# Patient Record
Sex: FEMALE | Race: BLACK OR AFRICAN AMERICAN | NOT HISPANIC OR LATINO | Employment: FULL TIME | URBAN - METROPOLITAN AREA
[De-identification: names, ages, dates, MRNs, and addresses within clinical notes are randomized per-mention and may not be internally consistent; named-entity substitution may affect disease eponyms.]

---

## 2023-03-15 DIAGNOSIS — I10 HYPERTENSION, UNSPECIFIED TYPE: Primary | ICD-10-CM

## 2023-03-15 RX ORDER — METOPROLOL TARTRATE 50 MG/1
1 TABLET ORAL
COMMUNITY
Start: 2020-02-22 | End: 2023-03-15 | Stop reason: SDUPTHER

## 2023-03-15 RX ORDER — METOPROLOL TARTRATE 50 MG/1
50 TABLET ORAL 2 TIMES DAILY
Qty: 180 TABLET | Refills: 0 | Status: SHIPPED | OUTPATIENT
Start: 2023-03-15 | End: 2023-07-20 | Stop reason: SDUPTHER

## 2023-05-11 PROBLEM — M51.36 ANNULAR TEAR OF LUMBAR DISC: Status: ACTIVE | Noted: 2023-05-11

## 2023-05-11 PROBLEM — M40.36 FLATBACK SYNDROME, LUMBAR REGION: Status: ACTIVE | Noted: 2023-05-11

## 2023-05-11 PROBLEM — M54.50 LOW BACK PAIN: Status: ACTIVE | Noted: 2023-05-11

## 2023-05-11 PROBLEM — H01.009 BLEPHARITIS: Status: ACTIVE | Noted: 2023-05-11

## 2023-05-11 PROBLEM — M47.819 ARTHRITIS, LOW BACK: Status: ACTIVE | Noted: 2023-05-11

## 2023-05-11 PROBLEM — R60.0 LEG EDEMA, RIGHT: Status: ACTIVE | Noted: 2023-05-11

## 2023-05-11 PROBLEM — I10 ESSENTIAL HYPERTENSION: Status: ACTIVE | Noted: 2023-05-11

## 2023-05-11 PROBLEM — R10.2 VAGINAL PAIN: Status: ACTIVE | Noted: 2023-05-11

## 2023-05-11 PROBLEM — M47.816 LUMBAR SPONDYLOSIS: Status: ACTIVE | Noted: 2023-05-11

## 2023-05-11 PROBLEM — E55.9 VITAMIN D DEFICIENCY: Status: ACTIVE | Noted: 2023-05-11

## 2023-05-11 PROBLEM — K63.5 BENIGN COLONIC POLYP: Status: ACTIVE | Noted: 2023-05-11

## 2023-05-11 PROBLEM — M50.00 CERVICAL DISC DISORDER WITH MYELOPATHY: Status: ACTIVE | Noted: 2023-05-11

## 2023-05-11 PROBLEM — M54.2 NECK PAIN: Status: ACTIVE | Noted: 2023-05-11

## 2023-05-11 PROBLEM — G47.30 SLEEP APNEA: Status: ACTIVE | Noted: 2023-05-11

## 2023-05-11 PROBLEM — E66.3 OVERWEIGHT: Status: ACTIVE | Noted: 2023-05-11

## 2023-05-11 PROBLEM — M79.651 PAIN OF RIGHT THIGH: Status: ACTIVE | Noted: 2023-05-11

## 2023-05-11 PROBLEM — D64.9 ANEMIA: Status: ACTIVE | Noted: 2023-05-11

## 2023-05-11 PROBLEM — K59.09 CHRONIC CONSTIPATION: Status: ACTIVE | Noted: 2023-05-11

## 2023-05-11 PROBLEM — M79.89 SWELLING OF RIGHT LOWER EXTREMITY: Status: ACTIVE | Noted: 2023-05-11

## 2023-05-11 PROBLEM — M25.571 ARTHRALGIA OF RIGHT ANKLE: Status: ACTIVE | Noted: 2023-05-11

## 2023-05-11 PROBLEM — R10.2 FEMALE PELVIC PAIN: Status: ACTIVE | Noted: 2023-05-11

## 2023-05-11 PROBLEM — M51.369 ANNULAR TEAR OF LUMBAR DISC: Status: ACTIVE | Noted: 2023-05-11

## 2023-05-11 PROBLEM — G62.9 NEUROPATHY: Status: ACTIVE | Noted: 2023-05-11

## 2023-05-11 PROBLEM — L65.9 HAIR LOSS: Status: ACTIVE | Noted: 2023-05-11

## 2023-05-11 PROBLEM — M16.11 PRIMARY OSTEOARTHRITIS OF RIGHT HIP: Status: ACTIVE | Noted: 2023-05-11

## 2023-05-11 PROBLEM — D21.9 FIBROIDS: Status: ACTIVE | Noted: 2023-05-11

## 2023-05-11 PROBLEM — I51.7 LEFT VENTRICULAR HYPERTROPHY BY ELECTROCARDIOGRAM: Status: ACTIVE | Noted: 2023-05-11

## 2023-05-11 PROBLEM — M21.40 PES PLANUS: Status: ACTIVE | Noted: 2023-05-11

## 2023-05-11 PROBLEM — E78.5 HYPERLIPIDEMIA: Status: ACTIVE | Noted: 2023-05-11

## 2023-05-11 PROBLEM — M70.51 BURSITIS OF RIGHT KNEE: Status: ACTIVE | Noted: 2023-05-11

## 2023-05-11 PROBLEM — M48.02 CERVICAL STENOSIS OF SPINAL CANAL: Status: ACTIVE | Noted: 2023-05-11

## 2023-05-11 PROBLEM — K62.5 RECTAL BLEEDING: Status: ACTIVE | Noted: 2023-05-11

## 2023-05-11 PROBLEM — R00.2 PALPITATIONS: Status: ACTIVE | Noted: 2023-05-11

## 2023-05-11 PROBLEM — M25.559 ARTHRALGIA OF HIP: Status: ACTIVE | Noted: 2023-05-11

## 2023-05-11 PROBLEM — R19.00 PELVIC MASS IN FEMALE: Status: ACTIVE | Noted: 2023-05-11

## 2023-05-11 PROBLEM — R25.2 SPASTICITY: Status: ACTIVE | Noted: 2023-05-11

## 2023-05-11 PROBLEM — R74.8 ELEVATED SERUM ALKALINE PHOSPHATASE LEVEL: Status: ACTIVE | Noted: 2023-05-11

## 2023-05-11 PROBLEM — M41.9 LUMBAR SCOLIOSIS: Status: ACTIVE | Noted: 2023-05-11

## 2023-05-12 ENCOUNTER — OFFICE VISIT (OUTPATIENT)
Dept: PRIMARY CARE | Facility: CLINIC | Age: 58
End: 2023-05-12
Payer: COMMERCIAL

## 2023-05-12 ENCOUNTER — LAB (OUTPATIENT)
Dept: LAB | Facility: LAB | Age: 58
End: 2023-05-12
Payer: COMMERCIAL

## 2023-05-12 VITALS
BODY MASS INDEX: 28.52 KG/M2 | TEMPERATURE: 95.5 F | WEIGHT: 155 LBS | SYSTOLIC BLOOD PRESSURE: 110 MMHG | DIASTOLIC BLOOD PRESSURE: 60 MMHG | HEIGHT: 62 IN

## 2023-05-12 DIAGNOSIS — Z00.00 HEALTHCARE MAINTENANCE: ICD-10-CM

## 2023-05-12 DIAGNOSIS — I10 ESSENTIAL HYPERTENSION: Primary | ICD-10-CM

## 2023-05-12 DIAGNOSIS — I10 ESSENTIAL HYPERTENSION: ICD-10-CM

## 2023-05-12 LAB
ALANINE AMINOTRANSFERASE (SGPT) (U/L) IN SER/PLAS: 15 U/L (ref 7–45)
ALBUMIN (G/DL) IN SER/PLAS: 4 G/DL (ref 3.4–5)
ALBUMIN (MG/L) IN URINE: <7 MG/L
ALBUMIN/CREATININE (UG/MG) IN URINE: NORMAL UG/MG CRT (ref 0–30)
ALKALINE PHOSPHATASE (U/L) IN SER/PLAS: 86 U/L (ref 33–110)
ANION GAP IN SER/PLAS: 11 MMOL/L (ref 10–20)
ASPARTATE AMINOTRANSFERASE (SGOT) (U/L) IN SER/PLAS: 15 U/L (ref 9–39)
BILIRUBIN TOTAL (MG/DL) IN SER/PLAS: 0.6 MG/DL (ref 0–1.2)
CALCIUM (MG/DL) IN SER/PLAS: 9.6 MG/DL (ref 8.6–10.6)
CARBON DIOXIDE, TOTAL (MMOL/L) IN SER/PLAS: 29 MMOL/L (ref 21–32)
CHLORIDE (MMOL/L) IN SER/PLAS: 104 MMOL/L (ref 98–107)
CHOLESTEROL (MG/DL) IN SER/PLAS: 210 MG/DL (ref 0–199)
CHOLESTEROL IN HDL (MG/DL) IN SER/PLAS: 62.2 MG/DL
CHOLESTEROL/HDL RATIO: 3.4
CREATININE (MG/DL) IN SER/PLAS: 0.98 MG/DL (ref 0.5–1.05)
CREATININE (MG/DL) IN URINE: 116 MG/DL (ref 20–320)
GFR FEMALE: 66 ML/MIN/1.73M2
GLUCOSE (MG/DL) IN SER/PLAS: 74 MG/DL (ref 74–99)
LDL: 133 MG/DL (ref 0–99)
POTASSIUM (MMOL/L) IN SER/PLAS: 4.2 MMOL/L (ref 3.5–5.3)
PROTEIN TOTAL: 7.1 G/DL (ref 6.4–8.2)
SODIUM (MMOL/L) IN SER/PLAS: 140 MMOL/L (ref 136–145)
TRIGLYCERIDE (MG/DL) IN SER/PLAS: 74 MG/DL (ref 0–149)
UREA NITROGEN (MG/DL) IN SER/PLAS: 16 MG/DL (ref 6–23)
VLDL: 15 MG/DL (ref 0–40)

## 2023-05-12 PROCEDURE — 3074F SYST BP LT 130 MM HG: CPT | Performed by: INTERNAL MEDICINE

## 2023-05-12 PROCEDURE — 80053 COMPREHEN METABOLIC PANEL: CPT

## 2023-05-12 PROCEDURE — 82043 UR ALBUMIN QUANTITATIVE: CPT

## 2023-05-12 PROCEDURE — 99214 OFFICE O/P EST MOD 30 MIN: CPT | Performed by: INTERNAL MEDICINE

## 2023-05-12 PROCEDURE — 80061 LIPID PANEL: CPT

## 2023-05-12 PROCEDURE — 36415 COLL VENOUS BLD VENIPUNCTURE: CPT

## 2023-05-12 PROCEDURE — 82570 ASSAY OF URINE CREATININE: CPT

## 2023-05-12 PROCEDURE — 3078F DIAST BP <80 MM HG: CPT | Performed by: INTERNAL MEDICINE

## 2023-05-12 RX ORDER — ATORVASTATIN CALCIUM 10 MG/1
1 TABLET, FILM COATED ORAL DAILY
COMMUNITY
Start: 2022-04-18 | End: 2023-08-16 | Stop reason: SDUPTHER

## 2023-05-12 RX ORDER — ASPIRIN 81 MG/1
1 TABLET ORAL DAILY
COMMUNITY
Start: 2021-09-08

## 2023-05-12 RX ORDER — TRIAMTERENE/HYDROCHLOROTHIAZID 37.5-25 MG
1 TABLET ORAL DAILY
COMMUNITY
End: 2023-06-08 | Stop reason: SDUPTHER

## 2023-05-12 RX ORDER — CHOLECALCIFEROL (VITAMIN D3) 25 MCG
1 TABLET ORAL DAILY
COMMUNITY
Start: 2017-03-04

## 2023-05-12 ASSESSMENT — PATIENT HEALTH QUESTIONNAIRE - PHQ9
2. FEELING DOWN, DEPRESSED OR HOPELESS: NOT AT ALL
1. LITTLE INTEREST OR PLEASURE IN DOING THINGS: NOT AT ALL
2. FEELING DOWN, DEPRESSED OR HOPELESS: NOT AT ALL
SUM OF ALL RESPONSES TO PHQ9 QUESTIONS 1 AND 2: 0
1. LITTLE INTEREST OR PLEASURE IN DOING THINGS: NOT AT ALL
SUM OF ALL RESPONSES TO PHQ9 QUESTIONS 1 & 2: 0

## 2023-05-12 ASSESSMENT — LIFESTYLE VARIABLES
HOW OFTEN DO YOU HAVE SIX OR MORE DRINKS ON ONE OCCASION: NEVER
HOW MANY STANDARD DRINKS CONTAINING ALCOHOL DO YOU HAVE ON A TYPICAL DAY: PATIENT DOES NOT DRINK
HOW OFTEN DO YOU HAVE A DRINK CONTAINING ALCOHOL: NEVER
AUDIT-C TOTAL SCORE: 0
SKIP TO QUESTIONS 9-10: 1

## 2023-05-12 NOTE — PROGRESS NOTES
Subjective   Patient ID: Ryann Alcaraz is a 57 y.o. female who presents for Follow-up.  HPI  57-year-old female with past medical history of hypertension and vitamin D deficiency is here for 3-month follow-up.  She denied any complaints today like chest pain, shortness of breath, cough, numbness and tingling.  The patient has been compliant with all her medications and blood pressure  in the office is 110/70.  Last lab work done in October 2022 and due for blood work on next visit.  Review of Systems  10 point ROS negative  Objective   Physical Exam  CVS S1 and S2 normal  Respiratory system CTAB  Extremities within normal limits  Assessment/Plan   There are no diagnoses linked to this encounter.    Hypertension  Continue metoprolol and Maxide  No change in medications  Will urine albumin     Hyperlipidemia  Atorvastatin  Check lipid panel     Will check CMP, lipid panel, urine albumin and mammogram.

## 2023-05-12 NOTE — PROGRESS NOTES
I reviewed with the resident the medical history and the resident’s findings on physical examination.  I discussed with the resident the patient’s diagnosis and concur with the treatment plan as documented in the resident note.     I saw and evaluated the patient. I personally obtained the key and critical portions of the history and physical exam or was physically present for key and critical portions performed by the trainee. I reviewed the trainee's documentation and discussed the patient with the trainee. I agree with the trainee's medical decision making, as documented on the trainee's note.     Rachel Cerrato MD

## 2023-06-08 DIAGNOSIS — I10 ESSENTIAL HYPERTENSION: Primary | ICD-10-CM

## 2023-06-08 RX ORDER — TRIAMTERENE/HYDROCHLOROTHIAZID 37.5-25 MG
1 TABLET ORAL DAILY
Qty: 90 TABLET | Refills: 0 | Status: SHIPPED | OUTPATIENT
Start: 2023-06-08 | End: 2023-08-16 | Stop reason: SDUPTHER

## 2023-07-20 DIAGNOSIS — I10 HYPERTENSION, UNSPECIFIED TYPE: ICD-10-CM

## 2023-07-20 RX ORDER — METOPROLOL TARTRATE 50 MG/1
50 TABLET ORAL 2 TIMES DAILY
Qty: 180 TABLET | Refills: 0 | Status: SHIPPED | OUTPATIENT
Start: 2023-07-20 | End: 2023-08-16 | Stop reason: SDUPTHER

## 2023-08-16 ENCOUNTER — TELEMEDICINE (OUTPATIENT)
Dept: PRIMARY CARE | Facility: CLINIC | Age: 58
End: 2023-08-16
Payer: COMMERCIAL

## 2023-08-16 DIAGNOSIS — I10 HYPERTENSION, UNSPECIFIED TYPE: ICD-10-CM

## 2023-08-16 DIAGNOSIS — Z00.00 ANNUAL PHYSICAL EXAM: ICD-10-CM

## 2023-08-16 DIAGNOSIS — E78.5 HYPERLIPIDEMIA, UNSPECIFIED HYPERLIPIDEMIA TYPE: ICD-10-CM

## 2023-08-16 DIAGNOSIS — E55.9 VITAMIN D DEFICIENCY: ICD-10-CM

## 2023-08-16 DIAGNOSIS — I10 ESSENTIAL HYPERTENSION: Primary | ICD-10-CM

## 2023-08-16 PROCEDURE — 99443 PR PHYS/QHP TELEPHONE EVALUATION 21-30 MIN: CPT | Performed by: INTERNAL MEDICINE

## 2023-08-16 RX ORDER — ATORVASTATIN CALCIUM 10 MG/1
10 TABLET, FILM COATED ORAL DAILY
Qty: 90 TABLET | Refills: 0 | Status: SHIPPED | OUTPATIENT
Start: 2023-08-16 | End: 2023-10-09 | Stop reason: SDUPTHER

## 2023-08-16 RX ORDER — METOPROLOL TARTRATE 50 MG/1
50 TABLET ORAL 2 TIMES DAILY
Qty: 180 TABLET | Refills: 0 | Status: SHIPPED | OUTPATIENT
Start: 2023-08-16 | End: 2024-01-10 | Stop reason: SDUPTHER

## 2023-08-16 RX ORDER — TRIAMTERENE/HYDROCHLOROTHIAZID 37.5-25 MG
1 TABLET ORAL DAILY
Qty: 90 TABLET | Refills: 0 | Status: SHIPPED | OUTPATIENT
Start: 2023-08-16 | End: 2023-09-08 | Stop reason: SDUPTHER

## 2023-08-16 ASSESSMENT — ENCOUNTER SYMPTOMS
CONSTITUTIONAL NEGATIVE: 1
CARDIOVASCULAR NEGATIVE: 1
RESPIRATORY NEGATIVE: 1
GASTROINTESTINAL NEGATIVE: 1

## 2023-08-16 NOTE — PROGRESS NOTES
Subjective   Patient ID: Ryann Alcaraz is a 57 y.o. female who presents for Follow-up.  HPI    Review of Systems   Constitutional: Negative.    Respiratory: Negative.     Cardiovascular: Negative.    Gastrointestinal: Negative.        Objective   Physical Exam  Neurological:      Mental Status: She is alert.   Psychiatric:         Mood and Affect: Mood normal.         Assessment/Plan   Problem List Items Addressed This Visit       Essential hypertension - Primary    Relevant Medications    triamterene-hydrochlorothiazid (Maxzide-25) 37.5-25 mg tablet    Hyperlipidemia    Relevant Medications    atorvastatin (Lipitor) 10 mg tablet    Vitamin D deficiency     Other Visit Diagnoses       Hypertension, unspecified type        Relevant Medications    metoprolol tartrate (Lopressor) 50 mg tablet    Annual physical exam        Relevant Orders    Referral to Obstetrics / Gynecology          The patient is here for a follow up on chronic medical problems.  His medications were reviewed, pharmacy updated, notes reviewed, prior labs reviewed.    An interactive audio and video telecommunication system which permits real time communications between the patient (at the originating site) and provider (at the distant site) was utilized to provide this telehealth service.    Verbal consent was requested and obtained from the patient on the day of encounter.       HTN.  Well controlled.  Continue with current medications.  Check BP at home daily.  Report to us if the numbers are higher than 130/80.        HLD  Stable  Continue with statins  Check lipids at the next visit    Due for a physical    Needs a new CPAP  Should contact the company and see what the requirements are  May need a new sleep study           Rachel Cerrato MD

## 2023-09-08 DIAGNOSIS — I10 ESSENTIAL HYPERTENSION: ICD-10-CM

## 2023-09-08 RX ORDER — TRIAMTERENE/HYDROCHLOROTHIAZID 37.5-25 MG
1 TABLET ORAL DAILY
Qty: 90 TABLET | Refills: 0 | Status: SHIPPED | OUTPATIENT
Start: 2023-09-08 | End: 2024-01-10 | Stop reason: SDUPTHER

## 2023-10-09 DIAGNOSIS — E78.5 HYPERLIPIDEMIA, UNSPECIFIED HYPERLIPIDEMIA TYPE: ICD-10-CM

## 2023-10-09 RX ORDER — ATORVASTATIN CALCIUM 10 MG/1
10 TABLET, FILM COATED ORAL DAILY
Qty: 90 TABLET | Refills: 0 | Status: SHIPPED | OUTPATIENT
Start: 2023-10-09 | End: 2024-01-10 | Stop reason: SDUPTHER

## 2023-10-10 ENCOUNTER — ANCILLARY PROCEDURE (OUTPATIENT)
Dept: RADIOLOGY | Facility: CLINIC | Age: 58
End: 2023-10-10
Payer: COMMERCIAL

## 2023-10-10 DIAGNOSIS — M79.662 PAIN OF LEFT LOWER LEG: ICD-10-CM

## 2023-10-10 PROCEDURE — 73610 X-RAY EXAM OF ANKLE: CPT | Mod: LEFT SIDE | Performed by: RADIOLOGY

## 2023-10-10 PROCEDURE — 73630 X-RAY EXAM OF FOOT: CPT | Mod: LEFT SIDE | Performed by: RADIOLOGY

## 2023-10-10 PROCEDURE — 73630 X-RAY EXAM OF FOOT: CPT | Mod: LT

## 2023-10-10 PROCEDURE — 73610 X-RAY EXAM OF ANKLE: CPT | Mod: LT,FY

## 2023-11-07 ENCOUNTER — ANCILLARY PROCEDURE (OUTPATIENT)
Dept: RADIOLOGY | Facility: CLINIC | Age: 58
End: 2023-11-07
Payer: COMMERCIAL

## 2023-11-07 DIAGNOSIS — Z12.31 ENCOUNTER FOR SCREENING MAMMOGRAM FOR MALIGNANT NEOPLASM OF BREAST: ICD-10-CM

## 2023-11-07 PROCEDURE — 77063 BREAST TOMOSYNTHESIS BI: CPT

## 2023-11-07 PROCEDURE — 77063 BREAST TOMOSYNTHESIS BI: CPT | Performed by: RADIOLOGY

## 2023-11-07 PROCEDURE — 77067 SCR MAMMO BI INCL CAD: CPT | Performed by: RADIOLOGY

## 2023-11-10 ENCOUNTER — LAB (OUTPATIENT)
Dept: LAB | Facility: LAB | Age: 58
End: 2023-11-10
Payer: COMMERCIAL

## 2023-11-10 ENCOUNTER — HOSPITAL ENCOUNTER (OUTPATIENT)
Dept: RADIOLOGY | Facility: HOSPITAL | Age: 58
Discharge: HOME | End: 2023-11-10
Payer: COMMERCIAL

## 2023-11-10 ENCOUNTER — OFFICE VISIT (OUTPATIENT)
Dept: PRIMARY CARE | Facility: CLINIC | Age: 58
End: 2023-11-10
Payer: COMMERCIAL

## 2023-11-10 VITALS
WEIGHT: 159 LBS | SYSTOLIC BLOOD PRESSURE: 142 MMHG | DIASTOLIC BLOOD PRESSURE: 88 MMHG | HEART RATE: 84 BPM | BODY MASS INDEX: 29.08 KG/M2

## 2023-11-10 DIAGNOSIS — I10 ESSENTIAL HYPERTENSION: ICD-10-CM

## 2023-11-10 DIAGNOSIS — E78.00 PURE HYPERCHOLESTEROLEMIA: ICD-10-CM

## 2023-11-10 DIAGNOSIS — M25.522 LEFT ELBOW PAIN: ICD-10-CM

## 2023-11-10 DIAGNOSIS — Z00.00 ANNUAL PHYSICAL EXAM: ICD-10-CM

## 2023-11-10 DIAGNOSIS — G47.34 IDIOPATHIC SLEEP RELATED NONOBSTRUCTIVE ALVEOLAR HYPOVENTILATION: ICD-10-CM

## 2023-11-10 DIAGNOSIS — M25.522 LEFT ELBOW PAIN: Primary | ICD-10-CM

## 2023-11-10 DIAGNOSIS — E55.9 VITAMIN D DEFICIENCY: ICD-10-CM

## 2023-11-10 DIAGNOSIS — Z12.11 ENCOUNTER FOR SCREENING COLONOSCOPY: ICD-10-CM

## 2023-11-10 LAB
25(OH)D3 SERPL-MCNC: 32 NG/ML (ref 30–100)
ALBUMIN SERPL BCP-MCNC: 4.2 G/DL (ref 3.4–5)
ALP SERPL-CCNC: 108 U/L (ref 33–136)
ALT SERPL W P-5'-P-CCNC: 35 U/L (ref 7–45)
ANION GAP SERPL CALC-SCNC: 11 MMOL/L (ref 10–20)
AST SERPL W P-5'-P-CCNC: 30 U/L (ref 9–39)
BILIRUB SERPL-MCNC: 0.6 MG/DL (ref 0–1.2)
BUN SERPL-MCNC: 18 MG/DL (ref 6–23)
CALCIUM SERPL-MCNC: 10.2 MG/DL (ref 8.6–10.6)
CHLORIDE SERPL-SCNC: 101 MMOL/L (ref 98–107)
CHOLEST SERPL-MCNC: 205 MG/DL (ref 0–199)
CHOLESTEROL/HDL RATIO: 2.9
CO2 SERPL-SCNC: 33 MMOL/L (ref 21–32)
CREAT SERPL-MCNC: 0.93 MG/DL (ref 0.5–1.05)
CREAT UR-MCNC: 104.4 MG/DL (ref 20–320)
ERYTHROCYTE [DISTWIDTH] IN BLOOD BY AUTOMATED COUNT: 12.1 % (ref 11.5–14.5)
EST. AVERAGE GLUCOSE BLD GHB EST-MCNC: 94 MG/DL
GFR SERPL CREATININE-BSD FRML MDRD: 71 ML/MIN/1.73M*2
GLUCOSE SERPL-MCNC: 74 MG/DL (ref 74–99)
HBA1C MFR BLD: 4.9 %
HCT VFR BLD AUTO: 42.4 % (ref 36–46)
HDLC SERPL-MCNC: 71.2 MG/DL
HGB BLD-MCNC: 13.8 G/DL (ref 12–16)
LDLC SERPL CALC-MCNC: 121 MG/DL
MCH RBC QN AUTO: 31.1 PG (ref 26–34)
MCHC RBC AUTO-ENTMCNC: 32.5 G/DL (ref 32–36)
MCV RBC AUTO: 96 FL (ref 80–100)
MICROALBUMIN UR-MCNC: <7 MG/L
MICROALBUMIN/CREAT UR: NORMAL MG/G{CREAT}
NON HDL CHOLESTEROL: 134 MG/DL (ref 0–149)
NRBC BLD-RTO: 0 /100 WBCS (ref 0–0)
PLATELET # BLD AUTO: 300 X10*3/UL (ref 150–450)
POTASSIUM SERPL-SCNC: 4.6 MMOL/L (ref 3.5–5.3)
PROT SERPL-MCNC: 7.8 G/DL (ref 6.4–8.2)
RBC # BLD AUTO: 4.44 X10*6/UL (ref 4–5.2)
SODIUM SERPL-SCNC: 140 MMOL/L (ref 136–145)
TRIGL SERPL-MCNC: 64 MG/DL (ref 0–149)
TSH SERPL-ACNC: 1.87 MIU/L (ref 0.44–3.98)
VIT B12 SERPL-MCNC: 480 PG/ML (ref 211–911)
VLDL: 13 MG/DL (ref 0–40)
WBC # BLD AUTO: 7.5 X10*3/UL (ref 4.4–11.3)

## 2023-11-10 PROCEDURE — 3077F SYST BP >= 140 MM HG: CPT | Performed by: INTERNAL MEDICINE

## 2023-11-10 PROCEDURE — 1036F TOBACCO NON-USER: CPT | Performed by: INTERNAL MEDICINE

## 2023-11-10 PROCEDURE — 73080 X-RAY EXAM OF ELBOW: CPT | Mod: LEFT SIDE | Performed by: RADIOLOGY

## 2023-11-10 PROCEDURE — 73080 X-RAY EXAM OF ELBOW: CPT | Mod: LT

## 2023-11-10 PROCEDURE — 82306 VITAMIN D 25 HYDROXY: CPT

## 2023-11-10 PROCEDURE — 80061 LIPID PANEL: CPT

## 2023-11-10 PROCEDURE — 3079F DIAST BP 80-89 MM HG: CPT | Performed by: INTERNAL MEDICINE

## 2023-11-10 PROCEDURE — 82607 VITAMIN B-12: CPT

## 2023-11-10 PROCEDURE — 82043 UR ALBUMIN QUANTITATIVE: CPT

## 2023-11-10 PROCEDURE — 36415 COLL VENOUS BLD VENIPUNCTURE: CPT

## 2023-11-10 PROCEDURE — 83036 HEMOGLOBIN GLYCOSYLATED A1C: CPT

## 2023-11-10 PROCEDURE — 80053 COMPREHEN METABOLIC PANEL: CPT

## 2023-11-10 PROCEDURE — 84443 ASSAY THYROID STIM HORMONE: CPT

## 2023-11-10 PROCEDURE — 82570 ASSAY OF URINE CREATININE: CPT

## 2023-11-10 PROCEDURE — 99396 PREV VISIT EST AGE 40-64: CPT | Performed by: INTERNAL MEDICINE

## 2023-11-10 PROCEDURE — 85027 COMPLETE CBC AUTOMATED: CPT

## 2023-11-10 ASSESSMENT — ENCOUNTER SYMPTOMS
ARTHRALGIAS: 1
JOINT SWELLING: 1

## 2023-11-10 ASSESSMENT — PATIENT HEALTH QUESTIONNAIRE - PHQ9
2. FEELING DOWN, DEPRESSED OR HOPELESS: NOT AT ALL
1. LITTLE INTEREST OR PLEASURE IN DOING THINGS: NOT AT ALL
SUM OF ALL RESPONSES TO PHQ9 QUESTIONS 1 AND 2: 0

## 2023-11-10 NOTE — ASSESSMENT & PLAN NOTE
-BP is elevated in 142/88 but her graph is usually normal.  -repeat BP in office is 132/85  -we will c/w with the same anti-HTN medications-maxzide 25 daily and metoprolol 50 BID

## 2023-11-10 NOTE — ASSESSMENT & PLAN NOTE
-Ace wrap and icing of the L ankle. Xray 10/23 reviewed ; no acute pathology.  -Flu vaccine taken  -Mammogram due 11/24  -Pap smear due 2023, pt will make an appointment with her OB/GYN.  -Colonoscopy due 2024 [last done 2019-tubular adenoma polyp resected]

## 2023-11-10 NOTE — PROGRESS NOTES
Subjective   Patient ID: Ryann Alcaraz is a 58 y.o. female who presents for Annual Exam.  HPI  PMHX of vit D deficiency, HTN, sleep apnea [on CPAP], HLD, arthritis.  Pt is in good spirits. She does not require any refills. She recently had L ankle which prompted urgent care visit, but the x-ray did not show nay acute pathology just an old osseous deformity and effusion.   She has a similar L elbow pain x 1 year, aching, 3/10, exacerbated with certain movements and tenderness on pressing on the joint. But it does not restrict her movements or interfere with her routine activities.     She denies any falls, F/C, N/V/D, SOB, chest pain, palpitations.     Review of Systems   Musculoskeletal:  Positive for arthralgias (L elbow pain) and joint swelling (L ankle swelling.).   All other systems reviewed and are negative.      Objective   Physical Exam  Vitals reviewed.   Constitutional:       Appearance: Normal appearance.   HENT:      Head: Normocephalic and atraumatic.      Mouth/Throat:      Mouth: Mucous membranes are moist.   Eyes:      Pupils: Pupils are equal, round, and reactive to light.   Cardiovascular:      Rate and Rhythm: Normal rate and regular rhythm.      Pulses: Normal pulses.      Heart sounds: Normal heart sounds.   Pulmonary:      Effort: Pulmonary effort is normal. No respiratory distress.      Breath sounds: Normal breath sounds.   Abdominal:      General: Abdomen is flat. Bowel sounds are normal.      Palpations: Abdomen is soft.   Musculoskeletal:         General: Swelling (L ankle swelling at the lasteral side) present. No tenderness. Normal range of motion.      Cervical back: Neck supple.   Skin:     General: Skin is warm and dry.   Neurological:      General: No focal deficit present.      Mental Status: She is alert and oriented to person, place, and time.   Psychiatric:         Mood and Affect: Mood normal.       Visit Vitals  /88   Pulse 84        Assessment/Plan   Problem List Items  Addressed This Visit       Essential hypertension     -BP is elevated in 142/88 but her graph is usually normal.  -repeat BP in office is 132/85  -we will c/w with the same anti-HTN medications-maxzide 25 daily and metoprolol 50 BID         Relevant Orders    CBC    Tsh With Reflex To Free T4 If Abnormal    Hyperlipidemia    Relevant Orders    Hemoglobin A1c    Lipid panel    Sleep apnea     -pt uses her CPAP machine regularly but it is very old and she needs a replacement.  -pt will call her insurance company to find out the requirements of replacing her old CPAP machine. We will send a referral to sleep medicine if insurance company demands one.          Vitamin D deficiency    Relevant Orders    Vitamin D 25-Hydroxy,Total (for eval of Vitamin D levels)    Left elbow pain - Primary    Relevant Orders    XR elbow left 3+ views    Annual physical exam     -Ace wrap and icing of the L ankle. Xray 10/23 reviewed ; no acute pathology.  -Flu vaccine taken  -Mammogram due 11/24  -Pap smear due 2023, pt will make an appointment with her OB/GYN.  -Colonoscopy due 2024 [last done 2019-tubular adenoma polyp resected]         Relevant Orders    CBC    Vitamin B12    Tsh With Reflex To Free T4 If Abnormal    Colonoscopy Screening; High Risk Patient; previous tubular adenoma polyp in 2019    Comprehensive metabolic panel    Albumin, urine, random    Encounter for screening colonoscopy    Relevant Orders    Colonoscopy Screening; High Risk Patient; previous tubular adenoma polyp in 2019

## 2023-11-10 NOTE — ASSESSMENT & PLAN NOTE
-pt uses her CPAP machine regularly but it is very old and she needs a replacement.  -pt will call her insurance company to find out the requirements of replacing her old CPAP machine. We will send a referral to sleep medicine if insurance company demands one.

## 2023-11-10 NOTE — PROGRESS NOTES
I saw and evaluated the patient. I personally obtained the key and critical portions of the history and physical exam or was physically present for key and critical portions performed by the resident/fellow. I reviewed the resident/fellow's documentation and discussed the patient with the resident/fellow. I agree with the resident/fellow's medical decision making as documented in the note.    Rachel Cerrato MD

## 2023-11-27 DIAGNOSIS — Z12.11 COLON CANCER SCREENING: Primary | ICD-10-CM

## 2023-11-27 RX ORDER — POLYETHYLENE GLYCOL 3350, SODIUM CHLORIDE, SODIUM BICARBONATE AND POTASSIUM CHLORIDE 420; 5.72; 11.2; 1.48 G/4L; G/4L; G/4L; G/4L
4000 POWDER, FOR SOLUTION ORAL ONCE
Qty: 4000 ML | Refills: 0 | Status: SHIPPED | OUTPATIENT
Start: 2023-11-27 | End: 2023-11-27

## 2024-01-10 DIAGNOSIS — E78.5 HYPERLIPIDEMIA, UNSPECIFIED HYPERLIPIDEMIA TYPE: ICD-10-CM

## 2024-01-10 DIAGNOSIS — I10 HYPERTENSION, UNSPECIFIED TYPE: ICD-10-CM

## 2024-01-10 DIAGNOSIS — I10 ESSENTIAL HYPERTENSION: ICD-10-CM

## 2024-01-10 RX ORDER — METOPROLOL TARTRATE 50 MG/1
50 TABLET ORAL 2 TIMES DAILY
Qty: 180 TABLET | Refills: 0 | Status: SHIPPED | OUTPATIENT
Start: 2024-01-10 | End: 2024-04-09

## 2024-01-10 RX ORDER — ATORVASTATIN CALCIUM 10 MG/1
10 TABLET, FILM COATED ORAL DAILY
Qty: 90 TABLET | Refills: 0 | Status: SHIPPED | OUTPATIENT
Start: 2024-01-10

## 2024-01-10 RX ORDER — TRIAMTERENE/HYDROCHLOROTHIAZID 37.5-25 MG
1 TABLET ORAL DAILY
Qty: 90 TABLET | Refills: 0 | Status: SHIPPED | OUTPATIENT
Start: 2024-01-10 | End: 2024-04-10 | Stop reason: SDUPTHER

## 2024-02-09 ENCOUNTER — APPOINTMENT (OUTPATIENT)
Dept: PRIMARY CARE | Facility: CLINIC | Age: 59
End: 2024-02-09
Payer: COMMERCIAL

## 2024-02-15 NOTE — H&P
History Of Present Illness  Ryann Alcaraz is a 58 y.o. female presenting with history of colon adenomas.     Past Medical History  Past Medical History:   Diagnosis Date    Abdominal distension (gaseous) 06/02/2018    Bloating    Abnormal results of liver function studies 06/09/2018    Abnormal liver function    Abnormal weight gain 12/12/2015    Abnormal weight gain    Body mass index (BMI) 29.0-29.9, adult 01/07/2022    Body mass index (BMI) of 29.0 to 29.9 in adult    Flat foot (pes planus) (acquired), unspecified foot 11/26/2018    Pes planus    Hypersomnia, unspecified 03/24/2015    Excessive sleepiness    Localized edema 09/30/2014    Pedal edema    Localized edema 11/26/2018    Leg edema, right    Other conditions influencing health status 12/12/2015    Stiffness of extremity    Other spondylosis with myelopathy, cervical region     Cervical spondylosis with myelopathy    Pain in right foot 12/12/2015    Pain in both feet    Pain in right foot 11/21/2018    Right foot pain    Pain in right knee 11/12/2018    Acute pain of right knee    Plantar fascial fibromatosis 12/12/2015    Plantar fasciitis    Posterior tibial tendinitis, unspecified leg 11/24/2014    Posterior tibial tendonitis    Radiculopathy, lumbar region 08/31/2018    Lumbar radiculopathy    S/P right breast biopsy 01/27/2009    Right benign excisional biopsy 1998, 2004, 2009    Scoliosis, unspecified     Scoliosis    Unspecified injury of right foot, initial encounter 11/16/2018    Right foot injury, initial encounter    Vitamin D deficiency, unspecified 06/20/2013    Vitamin D deficiency     Surgical History  Past Surgical History:   Procedure Laterality Date    BACK SURGERY  11/20/2016    Back Surgery    BREAST BIOPSY  06/19/2013    Biopsy Breast Percutaneous Needle Core    CERVICAL DISCECTOMY  06/19/2013    Spinal Diskectomy Cervical    COLONOSCOPY  05/30/2019    Complete Colonoscopy    OTHER SURGICAL HISTORY  02/24/2014    Thyroglossal Duct  Cyst Excision     Social History  She reports that she has never smoked. She has never used smokeless tobacco. She reports that she does not drink alcohol and does not use drugs.    Family History  Family History   Problem Relation Name Age of Onset    Breast cancer Father's Sister  80        Allergies  Allergies   Allergen Reactions    Duloxetine Diarrhea, Nausea Only and Unknown    Tramadol Nausea Only and Unknown     Review of Systems     Physical Exam  Vitals and nursing note reviewed.   Constitutional:       Appearance: Normal appearance.   Cardiovascular:      Rate and Rhythm: Normal rate and regular rhythm.      Pulses: Normal pulses.      Heart sounds: Normal heart sounds.   Pulmonary:      Effort: Pulmonary effort is normal.      Breath sounds: Normal breath sounds.   Abdominal:      General: Abdomen is flat.      Palpations: Abdomen is soft.   Neurological:      Mental Status: She is alert.          Last Recorded Vitals  There were no vitals taken for this visit.    Assessment/Plan   Colon adenomas    Proceed with colonoscopy     Reyes Wells MD

## 2024-02-16 ENCOUNTER — HOSPITAL ENCOUNTER (OUTPATIENT)
Dept: GASTROENTEROLOGY | Facility: HOSPITAL | Age: 59
Setting detail: OUTPATIENT SURGERY
Discharge: HOME | End: 2024-02-16
Payer: COMMERCIAL

## 2024-02-16 ENCOUNTER — ANESTHESIA (OUTPATIENT)
Dept: GASTROENTEROLOGY | Facility: HOSPITAL | Age: 59
End: 2024-02-16
Payer: COMMERCIAL

## 2024-02-16 ENCOUNTER — ANESTHESIA EVENT (OUTPATIENT)
Dept: GASTROENTEROLOGY | Facility: HOSPITAL | Age: 59
End: 2024-02-16
Payer: COMMERCIAL

## 2024-02-16 VITALS
TEMPERATURE: 97.2 F | WEIGHT: 160 LBS | OXYGEN SATURATION: 97 % | HEART RATE: 85 BPM | BODY MASS INDEX: 29.44 KG/M2 | SYSTOLIC BLOOD PRESSURE: 122 MMHG | RESPIRATION RATE: 17 BRPM | DIASTOLIC BLOOD PRESSURE: 68 MMHG | HEIGHT: 62 IN

## 2024-02-16 DIAGNOSIS — Z12.11 ENCOUNTER FOR SCREENING COLONOSCOPY: ICD-10-CM

## 2024-02-16 DIAGNOSIS — Z00.00 ANNUAL PHYSICAL EXAM: ICD-10-CM

## 2024-02-16 PROBLEM — G47.33 OSA (OBSTRUCTIVE SLEEP APNEA): Status: ACTIVE | Noted: 2023-05-11

## 2024-02-16 PROBLEM — T88.59XA DELAYED EMERGENCE FROM ANESTHESIA: Status: ACTIVE | Noted: 2024-02-16

## 2024-02-16 PROCEDURE — 2500000004 HC RX 250 GENERAL PHARMACY W/ HCPCS (ALT 636 FOR OP/ED): Performed by: INTERNAL MEDICINE

## 2024-02-16 PROCEDURE — 0753T DGTZ GLS MCRSCP SLD LEVEL IV: CPT | Mod: TC,SUR,AHULAB | Performed by: INTERNAL MEDICINE

## 2024-02-16 PROCEDURE — 45385 COLONOSCOPY W/LESION REMOVAL: CPT | Performed by: INTERNAL MEDICINE

## 2024-02-16 PROCEDURE — 7100000010 HC PHASE TWO TIME - EACH INCREMENTAL 1 MINUTE

## 2024-02-16 PROCEDURE — A45385 PR COLONOSCOPY,REMV LESN,SNARE: Performed by: NURSE ANESTHETIST, CERTIFIED REGISTERED

## 2024-02-16 PROCEDURE — 2500000004 HC RX 250 GENERAL PHARMACY W/ HCPCS (ALT 636 FOR OP/ED): Performed by: NURSE ANESTHETIST, CERTIFIED REGISTERED

## 2024-02-16 PROCEDURE — 3700000001 HC GENERAL ANESTHESIA TIME - INITIAL BASE CHARGE

## 2024-02-16 PROCEDURE — 88305 TISSUE EXAM BY PATHOLOGIST: CPT | Performed by: PATHOLOGY

## 2024-02-16 PROCEDURE — A45385 PR COLONOSCOPY,REMV LESN,SNARE: Performed by: ANESTHESIOLOGY

## 2024-02-16 PROCEDURE — 3700000002 HC GENERAL ANESTHESIA TIME - EACH INCREMENTAL 1 MINUTE

## 2024-02-16 PROCEDURE — 7100000009 HC PHASE TWO TIME - INITIAL BASE CHARGE

## 2024-02-16 RX ORDER — PROPOFOL 10 MG/ML
INJECTION, EMULSION INTRAVENOUS CONTINUOUS PRN
Status: DISCONTINUED | OUTPATIENT
Start: 2024-02-16 | End: 2024-02-16

## 2024-02-16 RX ORDER — MIDAZOLAM HYDROCHLORIDE 1 MG/ML
INJECTION, SOLUTION INTRAMUSCULAR; INTRAVENOUS AS NEEDED
Status: DISCONTINUED | OUTPATIENT
Start: 2024-02-16 | End: 2024-02-16

## 2024-02-16 RX ORDER — PROPOFOL 10 MG/ML
INJECTION, EMULSION INTRAVENOUS AS NEEDED
Status: DISCONTINUED | OUTPATIENT
Start: 2024-02-16 | End: 2024-02-16

## 2024-02-16 RX ORDER — MIDAZOLAM HYDROCHLORIDE 1 MG/ML
INJECTION INTRAMUSCULAR; INTRAVENOUS AS NEEDED
Status: DISCONTINUED | OUTPATIENT
Start: 2024-02-16 | End: 2024-02-16

## 2024-02-16 RX ORDER — SODIUM CHLORIDE, SODIUM LACTATE, POTASSIUM CHLORIDE, CALCIUM CHLORIDE 600; 310; 30; 20 MG/100ML; MG/100ML; MG/100ML; MG/100ML
20 INJECTION, SOLUTION INTRAVENOUS CONTINUOUS
Status: DISCONTINUED | OUTPATIENT
Start: 2024-02-16 | End: 2024-02-17 | Stop reason: HOSPADM

## 2024-02-16 RX ADMIN — PROPOFOL 50 MG: 10 INJECTION, EMULSION INTRAVENOUS at 10:15

## 2024-02-16 RX ADMIN — PROPOFOL 25 MCG/KG/MIN: 10 INJECTION, EMULSION INTRAVENOUS at 10:05

## 2024-02-16 RX ADMIN — PROPOFOL 50 MG: 10 INJECTION, EMULSION INTRAVENOUS at 10:13

## 2024-02-16 RX ADMIN — SODIUM CHLORIDE, POTASSIUM CHLORIDE, SODIUM LACTATE AND CALCIUM CHLORIDE 20 ML/HR: 600; 310; 30; 20 INJECTION, SOLUTION INTRAVENOUS at 09:19

## 2024-02-16 RX ADMIN — MIDAZOLAM HYDROCHLORIDE 2 MG: 1 INJECTION INTRAMUSCULAR; INTRAVENOUS at 10:05

## 2024-02-16 RX ADMIN — PROPOFOL 100 MG: 10 INJECTION, EMULSION INTRAVENOUS at 10:10

## 2024-02-16 ASSESSMENT — PAIN - FUNCTIONAL ASSESSMENT
PAIN_FUNCTIONAL_ASSESSMENT: 0-10

## 2024-02-16 ASSESSMENT — PAIN SCALES - GENERAL
PAINLEVEL_OUTOF10: 0 - NO PAIN
PAIN_LEVEL: 0

## 2024-02-16 ASSESSMENT — COLUMBIA-SUICIDE SEVERITY RATING SCALE - C-SSRS
6. HAVE YOU EVER DONE ANYTHING, STARTED TO DO ANYTHING, OR PREPARED TO DO ANYTHING TO END YOUR LIFE?: NO
2. HAVE YOU ACTUALLY HAD ANY THOUGHTS OF KILLING YOURSELF?: NO
1. IN THE PAST MONTH, HAVE YOU WISHED YOU WERE DEAD OR WISHED YOU COULD GO TO SLEEP AND NOT WAKE UP?: NO

## 2024-02-16 NOTE — ANESTHESIA PREPROCEDURE EVALUATION
Patient: Ryann Alcaraz    Procedure Information       Date/Time: 02/16/24 0950    Scheduled providers: Reyes Wells MD; Jayla Hess MD; [place] Conway; Ruth Bailon RN    Procedure: COLONOSCOPY    Location: [facility]                                                                                               Pre-Anesthesia Evaluation      Ryann Alcaraz is a 58 y.o. female who presents for procedure stated above.       Past Medical History:   Diagnosis Date    Abdominal distension (gaseous) 06/02/2018    Bloating    Abnormal results of liver function studies 06/09/2018    Abnormal liver function    Abnormal weight gain 12/12/2015    Abnormal weight gain    Body mass index (BMI) 29.0-29.9, adult 01/07/2022    Body mass index (BMI) of 29.0 to 29.9 in adult    Flat foot (pes planus) (acquired), unspecified foot 11/26/2018    Pes planus    Hypersomnia, unspecified 03/24/2015    Excessive sleepiness    Localized edema 09/30/2014    Pedal edema    Localized edema 11/26/2018    Leg edema, right    Other conditions influencing health status 12/12/2015    Stiffness of extremity    Other spondylosis with myelopathy, cervical region     Cervical spondylosis with myelopathy    Pain in right foot 12/12/2015    Pain in both feet    Pain in right foot 11/21/2018    Right foot pain    Pain in right knee 11/12/2018    Acute pain of right knee    Plantar fascial fibromatosis 12/12/2015    Plantar fasciitis    Posterior tibial tendinitis, unspecified leg 11/24/2014    Posterior tibial tendonitis    Radiculopathy, lumbar region 08/31/2018    Lumbar radiculopathy    S/P right breast biopsy 01/27/2009    Right benign excisional biopsy 1998, 2004, 2009    Scoliosis, unspecified     Scoliosis    Unspecified injury of right foot, initial encounter 11/16/2018    Right foot injury, initial encounter    Vitamin D deficiency, unspecified 06/20/2013    Vitamin D deficiency     Past Surgical History:  "  Procedure Laterality Date    BACK SURGERY  11/20/2016    Back Surgery    BREAST BIOPSY  06/19/2013    Biopsy Breast Percutaneous Needle Core    CERVICAL DISCECTOMY  06/19/2013    Spinal Diskectomy Cervical    COLONOSCOPY  05/30/2019    Complete Colonoscopy    OTHER SURGICAL HISTORY  02/24/2014    Thyroglossal Duct Cyst Excision     Social History     Tobacco Use    Smoking status: Never    Smokeless tobacco: Never   Vaping Use    Vaping Use: Never used   Substance Use Topics    Alcohol use: Never    Drug use: Never      Allergies   Allergen Reactions    Duloxetine Diarrhea, Nausea Only and Unknown    Tramadol Nausea Only and Unknown      Current Outpatient Medications   Medication Instructions    aspirin 81 mg EC tablet 1 tablet, oral, Daily    atorvastatin (LIPITOR) 10 mg, oral, Daily    cholecalciferol (Vitamin D-3) 25 MCG (1000 UT) tablet 1 tablet, oral, Daily    metoprolol tartrate (LOPRESSOR) 50 mg, oral, 2 times daily    multivit with minerals/lutein (MULTIVITAMIN 50 PLUS ORAL) 1 tablet, oral, Daily    triamterene-hydrochlorothiazid (Maxzide-25) 37.5-25 mg tablet 1 tablet, oral, Daily           Chemistry    Lab Results   Component Value Date/Time     11/10/2023 0952    K 4.6 11/10/2023 0952     11/10/2023 0952    CO2 33 (H) 11/10/2023 0952    BUN 18 11/10/2023 0952    CREATININE 0.93 11/10/2023 0952    Lab Results   Component Value Date/Time    CALCIUM 10.2 11/10/2023 0952    ALKPHOS 108 11/10/2023 0952    AST 30 11/10/2023 0952    ALT 35 11/10/2023 0952    BILITOT 0.6 11/10/2023 0952          Lab Results   Component Value Date/Time    WBC 7.5 11/10/2023 0952    HGB 13.8 11/10/2023 0952    HCT 42.4 11/10/2023 0952     11/10/2023 0952     No results found for: \"PROTIME\", \"PTT\", \"INR\"     Past Cardiology Tests (Last 3 Years):  EKG:  No results found for this or any previous visit.    Echo: 2018   1. The left ventricular systolic function is normal with a 60% estimated ejection fraction.   2. " "Spectral Doppler shows an impaired relaxation pattern of left ventricular diastolic filling.   3. RVSP within normal limits.   4. Aortic valve stenosis is not present.  No results found for this or any previous visit.    Cath:  No results found for this or any previous visit.    Stress Test:  No results found for this or any previous visit from the past 1000 days.      Visit Vitals  /71   Pulse 87   Temp 36.4 °C (97.5 °F) (Temporal)   Resp 17   Ht 1.575 m (5' 2\")   Wt 72.6 kg (160 lb)   LMP  (LMP Unknown)   SpO2 98%   BMI 29.26 kg/m²   OB Status Postmenopausal   Smoking Status Never   BSA 1.78 m²          Relevant Problems   Anesthesia   (+) Delayed emergence from anesthesia      Cardiovascular   (+) Essential hypertension   (+) Hyperlipidemia   (+) Left ventricular hypertrophy by electrocardiogram      GI   (+) Rectal bleeding      Pulmonary   (+) CLARITZA (obstructive sleep apnea)      Hematology   (+) Anemia      Musculoskeletal   (+) Annular tear of lumbar disc   (+) Cervical stenosis of spinal canal   (+) Lumbar scoliosis   (+) Lumbar spondylosis   (+) Primary osteoarthritis of right hip      Other   (+) Arthritis, low back       Clinical information reviewed:   Tobacco  Allergies  Meds   Med Hx  Surg Hx  OB Status  Fam Hx  Soc   Hx        NPO Detail:  NPO/Void Status  Carbohydrate Drink Given Prior to Surgery? : N  Date of Last Liquid: 02/16/24  Time of Last Liquid: 0500  Date of Last Solid: 02/14/24  Time of Last Solid: 2000  Last Intake Type: Clear fluids  Time of Last Void: 0917         Physical Exam    Airway  Mallampati: III  TM distance: >3 FB  Neck ROM: limited  Comments: Mouth opening adequate  Difficult airway anticipated- yes   Cardiovascular   Rhythm: regular  Rate: normal     Dental - normal exam     Pulmonary   Comments: Non labored respiration   Abdominal            Anesthesia Plan    History of general anesthesia?: yes  History of complications of general anesthesia?: yes    ASA 3 "     MAC     intravenous induction   Anesthetic plan and risks discussed with patient.    Plan discussed with CRNA and CAA.

## 2024-02-16 NOTE — ANESTHESIA POSTPROCEDURE EVALUATION
Patient: Ryann Alcaraz    Procedure Summary       Date: 02/16/24 Room / Location: [facility]    Anesthesia Start: 0958 Anesthesia Stop: 1027    Procedure: COLONOSCOPY Diagnosis:       Annual physical exam      Encounter for screening colonoscopy    Scheduled Providers: Reyes Wells MD; Jayla Hess MD; [place] Conway; Ruth Bailon RN Responsible Provider: DELISA Jade    Anesthesia Type: MAC ASA Status: 3            Anesthesia Type: MAC    Vitals Value Taken Time   /68 02/16/24 1100   Temp 36.2 °C (97.2 °F) 02/16/24 1100   Pulse 84 02/16/24 1102   Resp Normal 02/16/24 1131   SpO2 100 % 02/16/24 1102   Vitals shown include unvalidated device data.    Anesthesia Post Evaluation    Patient location during evaluation: floor  Patient participation: complete - patient participated  Level of consciousness: awake and alert  Pain score: 0  Pain management: adequate  Airway patency: patent  Cardiovascular status: stable  Respiratory status: spontaneous ventilation  Hydration status: acceptable  Postoperative Nausea and Vomiting: none        No notable events documented.

## 2024-02-19 NOTE — ADDENDUM NOTE
Encounter addended by: Gertrude Cohen RN on: 2/19/2024 10:07 AM   Actions taken: Contacts section saved, Flowsheet accepted October 9, 2017      Kathy Hanna, NP  [address] [place] [address]           [facility]  [address] [place] [address]  Phone: [phone]  Fax: [phone]          Patient: Alhaji Mendez Jr.   MR Number: [id number]   YOB: 1939   Date of Visit: 10/9/2017       Dear Kathy Hanna:    Thank you for referring Alhaji Mendez to me for evaluation. Attached you will find relevant portions of my assessment and plan of care.    If you have questions, please do not hesitate to call me. I look forward to following Alhaji Mendez along with you.    Sincerely,    Harper Win MD    Enclosure  CC:  No Recipients    If you would like to receive this communication electronically, please contact [email or web address] or [phone] to request more information on [employer] access.    For providers and/or their staff who would like to refer a patient to [facility], please contact us through our one-stop-shop provider referral line, [facility], at [phone].    If you feel you have received this communication in error or would no longer like to receive these types of communications, please e-mail [email or web address]

## 2024-02-26 LAB
LABORATORY COMMENT REPORT: NORMAL
PATH REPORT.FINAL DX SPEC: NORMAL
PATH REPORT.GROSS SPEC: NORMAL
PATH REPORT.TOTAL CANCER: NORMAL

## 2024-03-05 ENCOUNTER — OFFICE VISIT (OUTPATIENT)
Dept: PRIMARY CARE | Facility: CLINIC | Age: 59
End: 2024-03-05
Payer: COMMERCIAL

## 2024-03-05 VITALS — DIASTOLIC BLOOD PRESSURE: 72 MMHG | SYSTOLIC BLOOD PRESSURE: 120 MMHG | BODY MASS INDEX: 28.53 KG/M2 | WEIGHT: 156 LBS

## 2024-03-05 DIAGNOSIS — H61.21 EXCESSIVE CERUMEN IN EAR CANAL, RIGHT: Primary | ICD-10-CM

## 2024-03-05 DIAGNOSIS — E78.00 PURE HYPERCHOLESTEROLEMIA: ICD-10-CM

## 2024-03-05 DIAGNOSIS — I10 ESSENTIAL HYPERTENSION: ICD-10-CM

## 2024-03-05 PROCEDURE — 3078F DIAST BP <80 MM HG: CPT | Performed by: INTERNAL MEDICINE

## 2024-03-05 PROCEDURE — 1036F TOBACCO NON-USER: CPT | Performed by: INTERNAL MEDICINE

## 2024-03-05 PROCEDURE — 99213 OFFICE O/P EST LOW 20 MIN: CPT | Performed by: INTERNAL MEDICINE

## 2024-03-05 PROCEDURE — 3074F SYST BP LT 130 MM HG: CPT | Performed by: INTERNAL MEDICINE

## 2024-03-05 ASSESSMENT — PATIENT HEALTH QUESTIONNAIRE - PHQ9
1. LITTLE INTEREST OR PLEASURE IN DOING THINGS: NOT AT ALL
SUM OF ALL RESPONSES TO PHQ9 QUESTIONS 1 AND 2: 0
2. FEELING DOWN, DEPRESSED OR HOPELESS: NOT AT ALL

## 2024-03-05 NOTE — PROGRESS NOTES
I reviewed the resident/fellow's documentation and discussed the patient with the resident/fellow. I agree with the resident/fellow's medical decision making as documented in the note.    Rachel Cerrato MD

## 2024-04-10 DIAGNOSIS — I10 HYPERTENSION, UNSPECIFIED TYPE: ICD-10-CM

## 2024-04-10 RX ORDER — TRIAMTERENE/HYDROCHLOROTHIAZID 37.5-25 MG
1 TABLET ORAL DAILY
Qty: 90 TABLET | Refills: 0 | Status: SHIPPED | OUTPATIENT
Start: 2024-04-10

## 2024-06-12 ENCOUNTER — APPOINTMENT (OUTPATIENT)
Dept: PRIMARY CARE | Facility: CLINIC | Age: 59
End: 2024-06-12
Payer: COMMERCIAL

## 2024-06-12 ENCOUNTER — LAB (OUTPATIENT)
Dept: LAB | Facility: LAB | Age: 59
End: 2024-06-12
Payer: COMMERCIAL

## 2024-06-12 VITALS — SYSTOLIC BLOOD PRESSURE: 120 MMHG | DIASTOLIC BLOOD PRESSURE: 80 MMHG | BODY MASS INDEX: 29.08 KG/M2 | WEIGHT: 159 LBS

## 2024-06-12 DIAGNOSIS — I10 ESSENTIAL HYPERTENSION: Primary | ICD-10-CM

## 2024-06-12 DIAGNOSIS — I51.7 LEFT VENTRICULAR HYPERTROPHY BY ELECTROCARDIOGRAM: ICD-10-CM

## 2024-06-12 DIAGNOSIS — I10 ESSENTIAL HYPERTENSION: ICD-10-CM

## 2024-06-12 DIAGNOSIS — E78.00 PURE HYPERCHOLESTEROLEMIA: ICD-10-CM

## 2024-06-12 LAB
ALBUMIN SERPL BCP-MCNC: 4.2 G/DL (ref 3.4–5)
ALP SERPL-CCNC: 96 U/L (ref 33–136)
ALT SERPL W P-5'-P-CCNC: 24 U/L (ref 7–45)
ANION GAP SERPL CALC-SCNC: 13 MMOL/L (ref 10–20)
AST SERPL W P-5'-P-CCNC: 23 U/L (ref 9–39)
BILIRUB SERPL-MCNC: 0.9 MG/DL (ref 0–1.2)
BUN SERPL-MCNC: 14 MG/DL (ref 6–23)
CALCIUM SERPL-MCNC: 10.2 MG/DL (ref 8.6–10.6)
CHLORIDE SERPL-SCNC: 102 MMOL/L (ref 98–107)
CO2 SERPL-SCNC: 29 MMOL/L (ref 21–32)
CREAT SERPL-MCNC: 0.87 MG/DL (ref 0.5–1.05)
CREAT UR-MCNC: 132 MG/DL (ref 20–320)
EGFRCR SERPLBLD CKD-EPI 2021: 76 ML/MIN/1.73M*2
GLUCOSE SERPL-MCNC: 89 MG/DL (ref 74–99)
MICROALBUMIN UR-MCNC: 8.6 MG/L
MICROALBUMIN/CREAT UR: 6.5 UG/MG CREAT
POTASSIUM SERPL-SCNC: 4.2 MMOL/L (ref 3.5–5.3)
PROT SERPL-MCNC: 7.7 G/DL (ref 6.4–8.2)
SODIUM SERPL-SCNC: 140 MMOL/L (ref 136–145)

## 2024-06-12 PROCEDURE — 3079F DIAST BP 80-89 MM HG: CPT | Performed by: INTERNAL MEDICINE

## 2024-06-12 PROCEDURE — 3074F SYST BP LT 130 MM HG: CPT | Performed by: INTERNAL MEDICINE

## 2024-06-12 PROCEDURE — 82043 UR ALBUMIN QUANTITATIVE: CPT

## 2024-06-12 PROCEDURE — 80053 COMPREHEN METABOLIC PANEL: CPT

## 2024-06-12 PROCEDURE — 36415 COLL VENOUS BLD VENIPUNCTURE: CPT

## 2024-06-12 PROCEDURE — 82570 ASSAY OF URINE CREATININE: CPT

## 2024-06-12 PROCEDURE — 99214 OFFICE O/P EST MOD 30 MIN: CPT | Performed by: INTERNAL MEDICINE

## 2024-06-12 NOTE — PROGRESS NOTES
I reviewed the resident/fellow's documentation and discussed the patient with the resident/fellow. I agree with the resident/fellow's medical decision making as documented in the note.  As the attending physician, I certify that I personally reviewed the patient's history and personally examined the patient to confirm the physical findings described above, and that I reviewed the relevant imaging studies and available reports. I also discussed the differential diagnosis and all of the proposed management plans with the patient and individuals accompanying the patient to this visit. They had the opportunity to ask questions about the proposed management plans and to have those questions answered.     Rachel Cerrato MD

## 2024-06-12 NOTE — PROGRESS NOTES
Chief Complaint:   Chief Complaint   Patient presents with    Follow-up      Subjective     HPI    58 y.o. female with a PMH significant for essential hypertension,hyperlipidemia, presents to the clinic for routine follow up and medication refill. During this encounter patient c/o muscle aches which she believes related to statins. She describe the discomfort as soreness and not in any way debilitating. Muscle aches resolve when she would stop taking statins. No new complaints or concerns. ROS otherwise negative.         Objective    Visit Vitals  /80      BMI Readings from Last 15 Encounters:   06/12/24 29.08 kg/m²   03/05/24 28.53 kg/m²   02/16/24 29.26 kg/m²   11/10/23 29.08 kg/m²   05/12/23 28.35 kg/m²   01/16/23 28.90 kg/m²   11/03/22 29.26 kg/m²   10/10/22 29.52 kg/m²   08/29/22 28.53 kg/m²   07/19/22 28.90 kg/m²   04/18/22 30.66 kg/m²   01/07/22 29.69 kg/m²   09/08/21 30.35 kg/m²   06/14/21 31.44 kg/m²   03/02/21 30.27 kg/m²      Lab Results   Component Value Date    HGBA1C 4.9 11/10/2023      Lab Results   Component Value Date    HGBA1C 4.9 11/10/2023    HGBA1C 4.9 10/10/2022    HGBA1C 5.6 01/07/2022     Lab Results   Component Value Date    LDLCALC 121 (H) 11/10/2023    CREATININE 0.93 11/10/2023      Lab Results   Component Value Date    WBC 7.5 11/10/2023    HGB 13.8 11/10/2023    HCT 42.4 11/10/2023    MCV 96 11/10/2023     11/10/2023        Chemistry    Lab Results   Component Value Date/Time     11/10/2023 0952    K 4.6 11/10/2023 0952     11/10/2023 0952    CO2 33 (H) 11/10/2023 0952    BUN 18 11/10/2023 0952    CREATININE 0.93 11/10/2023 0952    Lab Results   Component Value Date/Time    CALCIUM 10.2 11/10/2023 0952    ALKPHOS 108 11/10/2023 0952    AST 30 11/10/2023 0952    ALT 35 11/10/2023 0952    BILITOT 0.6 11/10/2023 0952           No images are attached to the encounter.   [unfilled]       Physical Exam   Gen: well appearing, in NAD  HEENT: AT/NC, no conjunctival  pallor, anicteric sclera, EOMI   Neck: supple, no LAD/JVD  Chest: Good air entry b/l, no adventitious sounds heard  CVS: s1 & S2 only, no MGR  Abd: soft, flat, NT/ND, BS+  Ext: no cyanosis/clubbing or pedal edema  Neuro: Aox3, cn 2-12 intact, motor 5/5 globally, sensation intact    The 10-year ASCVD risk score (Rosangela ZAVALETA, et al., 2019) is: 5%    Values used to calculate the score:      Age: 58 years      Sex: Female      Is Non- : Yes      Diabetic: No      Tobacco smoker: No      Systolic Blood Pressure: 120 mmHg      Is BP treated: Yes      HDL Cholesterol: 71.2 mg/dL      Total Cholesterol: 205 mg/dL     For more information, please refer to:  http://static.heart.org/riskcalc/meghan/index.html  http://tools.acc.org/hdzno-uqqg-nobqnarbs-plus/    * Atherosclerotic cardiovascular disease (ASCVD), defined as coronary death or nonfatal myocardial infarction, or fatal or nonfatal stroke, based on the Pooled Cohort Equations and the work of karla Martin al., respectively.    ** The information required to estimate ASCVD risk includes age, sex, race, total cholesterol, HDL cholesterol, systolic blood pressure, blood pressure lowering medication use, diabetes status, and smoking status.     Assessment/Plan     The following medical issues were discussed during this encounter  :   # Hypertension   -Stable and well controlled. 120/72   -Continue Lopressor 50 mg BID   -Continue Maxzide-25    #Statin related myalgia   -Discontinue statins   -The 10-year ASCVD risk score (Rosangela ZAVALETA, et al., 2019) is: 5%  -Will monitor Lipid profile at next appointment      #Constipation   -Increase fiber content   -Colonoscopy in 2024 unremarkable   -Will revisit at next appointment     -Mammogram due 11/24  -Pap smear due 2023, pt will make an appointment with her OB/GYN.  -Colonoscopy done 2024; next due in 5 years due to hx of tubular adenoma 2019      Please return in 3 months or if symptoms worsen     Thad  MD Pilar

## 2024-06-26 ENCOUNTER — APPOINTMENT (OUTPATIENT)
Dept: PRIMARY CARE | Facility: CLINIC | Age: 59
End: 2024-06-26
Payer: COMMERCIAL

## 2024-09-30 DIAGNOSIS — I10 HYPERTENSION, UNSPECIFIED TYPE: ICD-10-CM

## 2024-09-30 RX ORDER — TRIAMTERENE/HYDROCHLOROTHIAZID 37.5-25 MG
1 TABLET ORAL DAILY
Qty: 90 TABLET | Refills: 0 | Status: SHIPPED | OUTPATIENT
Start: 2024-09-30

## 2024-10-08 ENCOUNTER — APPOINTMENT (OUTPATIENT)
Dept: PRIMARY CARE | Facility: CLINIC | Age: 59
End: 2024-10-08
Payer: COMMERCIAL

## 2024-10-08 ENCOUNTER — LAB (OUTPATIENT)
Dept: LAB | Facility: LAB | Age: 59
End: 2024-10-08
Payer: COMMERCIAL

## 2024-10-08 VITALS — WEIGHT: 160 LBS | SYSTOLIC BLOOD PRESSURE: 115 MMHG | BODY MASS INDEX: 29.26 KG/M2 | DIASTOLIC BLOOD PRESSURE: 75 MMHG

## 2024-10-08 DIAGNOSIS — Z23 FLU VACCINE NEED: ICD-10-CM

## 2024-10-08 DIAGNOSIS — I10 ESSENTIAL HYPERTENSION: Primary | ICD-10-CM

## 2024-10-08 DIAGNOSIS — M54.2 NECK PAIN: ICD-10-CM

## 2024-10-08 DIAGNOSIS — Z23 NEED FOR TDAP VACCINATION: ICD-10-CM

## 2024-10-08 DIAGNOSIS — E55.9 VITAMIN D DEFICIENCY: ICD-10-CM

## 2024-10-08 DIAGNOSIS — I10 HYPERTENSION, UNSPECIFIED TYPE: ICD-10-CM

## 2024-10-08 DIAGNOSIS — M47.816 LUMBAR SPONDYLOSIS: ICD-10-CM

## 2024-10-08 DIAGNOSIS — G47.33 OSA (OBSTRUCTIVE SLEEP APNEA): ICD-10-CM

## 2024-10-08 DIAGNOSIS — E78.00 PURE HYPERCHOLESTEROLEMIA: ICD-10-CM

## 2024-10-08 DIAGNOSIS — I51.7 LEFT VENTRICULAR HYPERTROPHY BY ELECTROCARDIOGRAM: ICD-10-CM

## 2024-10-08 DIAGNOSIS — Z00.00 ANNUAL PHYSICAL EXAM: ICD-10-CM

## 2024-10-08 DIAGNOSIS — I10 ESSENTIAL HYPERTENSION: ICD-10-CM

## 2024-10-08 LAB
25(OH)D3 SERPL-MCNC: 29 NG/ML (ref 30–100)
ALBUMIN SERPL BCP-MCNC: 4.1 G/DL (ref 3.4–5)
ALP SERPL-CCNC: 71 U/L (ref 33–136)
ALT SERPL W P-5'-P-CCNC: 17 U/L (ref 7–45)
ANION GAP SERPL CALC-SCNC: 15 MMOL/L (ref 10–20)
AST SERPL W P-5'-P-CCNC: 18 U/L (ref 9–39)
BILIRUB SERPL-MCNC: 0.7 MG/DL (ref 0–1.2)
BUN SERPL-MCNC: 20 MG/DL (ref 6–23)
CALCIUM SERPL-MCNC: 10 MG/DL (ref 8.6–10.6)
CHLORIDE SERPL-SCNC: 101 MMOL/L (ref 98–107)
CHOLEST SERPL-MCNC: 241 MG/DL (ref 0–199)
CHOLESTEROL/HDL RATIO: 3.4
CO2 SERPL-SCNC: 29 MMOL/L (ref 21–32)
CREAT SERPL-MCNC: 0.98 MG/DL (ref 0.5–1.05)
CREAT UR-MCNC: 100.3 MG/DL (ref 20–320)
EGFRCR SERPLBLD CKD-EPI 2021: 66 ML/MIN/1.73M*2
GLUCOSE SERPL-MCNC: 80 MG/DL (ref 74–99)
HDLC SERPL-MCNC: 71.5 MG/DL
LDLC SERPL CALC-MCNC: 157 MG/DL
MICROALBUMIN UR-MCNC: 8 MG/L
MICROALBUMIN/CREAT UR: 8 UG/MG CREAT
NON HDL CHOLESTEROL: 170 MG/DL (ref 0–149)
POTASSIUM SERPL-SCNC: 3.9 MMOL/L (ref 3.5–5.3)
PROT SERPL-MCNC: 7.4 G/DL (ref 6.4–8.2)
SODIUM SERPL-SCNC: 141 MMOL/L (ref 136–145)
TRIGL SERPL-MCNC: 63 MG/DL (ref 0–149)
TSH SERPL-ACNC: 1.58 MIU/L (ref 0.44–3.98)
VLDL: 13 MG/DL (ref 0–40)

## 2024-10-08 PROCEDURE — 80061 LIPID PANEL: CPT

## 2024-10-08 PROCEDURE — 90656 IIV3 VACC NO PRSV 0.5 ML IM: CPT | Performed by: INTERNAL MEDICINE

## 2024-10-08 PROCEDURE — 3078F DIAST BP <80 MM HG: CPT | Performed by: INTERNAL MEDICINE

## 2024-10-08 PROCEDURE — 84443 ASSAY THYROID STIM HORMONE: CPT

## 2024-10-08 PROCEDURE — 85027 COMPLETE CBC AUTOMATED: CPT

## 2024-10-08 PROCEDURE — 82043 UR ALBUMIN QUANTITATIVE: CPT

## 2024-10-08 PROCEDURE — 90471 IMMUNIZATION ADMIN: CPT | Performed by: INTERNAL MEDICINE

## 2024-10-08 PROCEDURE — 36415 COLL VENOUS BLD VENIPUNCTURE: CPT

## 2024-10-08 PROCEDURE — 82570 ASSAY OF URINE CREATININE: CPT

## 2024-10-08 PROCEDURE — 82306 VITAMIN D 25 HYDROXY: CPT

## 2024-10-08 PROCEDURE — 99214 OFFICE O/P EST MOD 30 MIN: CPT | Performed by: INTERNAL MEDICINE

## 2024-10-08 PROCEDURE — 90715 TDAP VACCINE 7 YRS/> IM: CPT | Performed by: INTERNAL MEDICINE

## 2024-10-08 PROCEDURE — 80053 COMPREHEN METABOLIC PANEL: CPT

## 2024-10-08 PROCEDURE — 90472 IMMUNIZATION ADMIN EACH ADD: CPT | Performed by: INTERNAL MEDICINE

## 2024-10-08 PROCEDURE — 3074F SYST BP LT 130 MM HG: CPT | Performed by: INTERNAL MEDICINE

## 2024-10-08 PROCEDURE — 83036 HEMOGLOBIN GLYCOSYLATED A1C: CPT

## 2024-10-08 RX ORDER — METOPROLOL TARTRATE 50 MG/1
50 TABLET ORAL 2 TIMES DAILY
Qty: 180 TABLET | Refills: 0 | Status: SHIPPED | OUTPATIENT
Start: 2024-10-08 | End: 2025-01-06

## 2024-10-08 RX ORDER — CELECOXIB 100 MG/1
100 CAPSULE ORAL 2 TIMES DAILY
Qty: 60 CAPSULE | Refills: 3 | Status: SHIPPED | OUTPATIENT
Start: 2024-10-08 | End: 2024-10-09 | Stop reason: SDUPTHER

## 2024-10-08 ASSESSMENT — ENCOUNTER SYMPTOMS
CONSTITUTIONAL NEGATIVE: 1
RESPIRATORY NEGATIVE: 1
GASTROINTESTINAL NEGATIVE: 1
CARDIOVASCULAR NEGATIVE: 1

## 2024-10-08 NOTE — PROGRESS NOTES
Chief Complaint:   Chief Complaint   Patient presents with    Follow-up      HPI    Ryann Alcaraz is a 59 y.o. year old female who presents to the clinic forfollow up     Past Medical History   Past Medical History:   Diagnosis Date    Abdominal distension (gaseous) 06/02/2018    Bloating    Abnormal results of liver function studies 06/09/2018    Abnormal liver function    Abnormal weight gain 12/12/2015    Abnormal weight gain    Body mass index (BMI) 29.0-29.9, adult 01/07/2022    Body mass index (BMI) of 29.0 to 29.9 in adult    Flat foot (pes planus) (acquired), unspecified foot 11/26/2018    Pes planus    Hypersomnia, unspecified 03/24/2015    Excessive sleepiness    Localized edema 09/30/2014    Pedal edema    Localized edema 11/26/2018    Leg edema, right    Other conditions influencing health status 12/12/2015    Stiffness of extremity    Other spondylosis with myelopathy, cervical region     Cervical spondylosis with myelopathy    Pain in right foot 12/12/2015    Pain in both feet    Pain in right foot 11/21/2018    Right foot pain    Pain in right knee 11/12/2018    Acute pain of right knee    Plantar fascial fibromatosis 12/12/2015    Plantar fasciitis    Posterior tibial tendinitis, unspecified leg 11/24/2014    Posterior tibial tendonitis    Radiculopathy, lumbar region 08/31/2018    Lumbar radiculopathy    S/P right breast biopsy 01/27/2009    Right benign excisional biopsy 1998, 2004, 2009    Scoliosis, unspecified     Scoliosis    Unspecified injury of right foot, initial encounter 11/16/2018    Right foot injury, initial encounter    Vitamin D deficiency, unspecified 06/20/2013    Vitamin D deficiency      Past Surgical History:   Past Surgical History:   Procedure Laterality Date    BACK SURGERY  11/20/2016    Back Surgery    BREAST BIOPSY  06/19/2013    Biopsy Breast Percutaneous Needle Core    CERVICAL DISCECTOMY  06/19/2013    Spinal Diskectomy Cervical    COLONOSCOPY  05/30/2019    Complete  Colonoscopy    OTHER SURGICAL HISTORY  02/24/2014    Thyroglossal Duct Cyst Excision     Family History:   Family History   Problem Relation Name Age of Onset    Breast cancer Father's Sister  80     Social History:   Tobacco Use: Low Risk  (2/16/2024)    Patient History     Smoking Tobacco Use: Never     Smokeless Tobacco Use: Never     Passive Exposure: Not on file      Social History     Substance and Sexual Activity   Alcohol Use Never        Allergies:   Allergies   Allergen Reactions    Duloxetine Diarrhea, Nausea Only and Unknown    Tramadol Nausea Only and Unknown        ROS   Review of Systems   Constitutional: Negative.    Respiratory: Negative.     Cardiovascular: Negative.    Gastrointestinal: Negative.         Objective   Vitals:    10/08/24 1029   BP: 115/75      BMI Readings from Last 15 Encounters:   10/08/24 29.26 kg/m²   06/12/24 29.08 kg/m²   03/05/24 28.53 kg/m²   02/16/24 29.26 kg/m²   11/10/23 29.08 kg/m²   10/10/23 29.27 kg/m²   05/12/23 28.35 kg/m²   01/16/23 28.90 kg/m²   11/03/22 29.26 kg/m²   10/10/22 29.52 kg/m²   08/29/22 28.53 kg/m²   07/19/22 28.90 kg/m²   04/18/22 30.66 kg/m²   01/07/22 29.69 kg/m²   09/08/21 30.35 kg/m²      72.6 kg (160 lb) (10/8/2024 10:29 AM)      Physical Exam  Physical Exam  Constitutional:       Appearance: She is well-developed.   Cardiovascular:      Rate and Rhythm: Normal rate and regular rhythm.      Heart sounds: Normal heart sounds. No murmur heard.  Pulmonary:      Effort: Pulmonary effort is normal.      Breath sounds: Normal breath sounds.   Abdominal:      General: Bowel sounds are normal.      Palpations: Abdomen is soft.          Labs:  CBC:  Recent Labs     11/10/23  0952 10/10/22  1016 04/18/22  0955   WBC 7.5 8.5 7.8   HGB 13.8 14.2 14.0   HCT 42.4 43.2 44.1    278 314   MCV 96 96 91     CMP:  Recent Labs     06/12/24  1458 11/10/23  0952 05/12/23  1151    140 140   K 4.2 4.6 4.2    101 104   CO2 29 33* 29   ANIONGAP 13 11 11  "  BUN 14 18 16   CREATININE 0.87 0.93 0.98   EGFR 76 71  --    GLUCOSE 89 74 74     Recent Labs     06/12/24  1458 11/10/23  0952 05/12/23  1151   ALBUMIN 4.2 4.2 4.0   ALKPHOS 96 108 86   ALT 24 35 15   AST 23 30 15   BILITOT 0.9 0.6 0.6     Calcium/Phos:   Lab Results   Component Value Date    CALCIUM 10.2 06/12/2024      COAG: No results for input(s): \"INR\", \"DDIMERVTE\" in the last 05606 hours.  CRP:   Lab Results   Component Value Date    CRP 0.31 11/12/2018      [unfilled]   ENDO:  Recent Labs     11/10/23  0952 10/10/22  1016 01/07/22  1220 03/02/21  1012   TSH 1.87 1.30 0.98 1.40   HGBA1C 4.9 4.9 5.6 4.9      CARDIAC: No results for input(s): \"LDH\", \"CKMB\", \"TROPHS\", \"BNP\" in the last 16636 hours.    No lab exists for component: \"CK\", \"CKMBP\"  Recent Labs     11/10/23  0952 05/12/23  1151 10/10/22  1016 07/18/22  0958   CHOL 205* 210* 194 179   LDLF  --  133* 112* 91   LDLCALC 121*  --   --   --    HDL 71.2 62.2 70.4 75.6   TRIG 64 74 56 60     No data recorded    Current Medications:  Current Outpatient Medications   Medication Sig Dispense Refill    aspirin 81 mg EC tablet Take 1 tablet (81 mg) by mouth once daily.      celecoxib (CeleBREX) 100 mg capsule Take 1 capsule (100 mg) by mouth 2 times a day. 60 capsule 3    cholecalciferol (Vitamin D-3) 25 MCG (1000 UT) tablet Take 1 tablet (25 mcg) by mouth once daily.      metoprolol tartrate (Lopressor) 50 mg tablet Take 1 tablet by mouth 2 times a day. 180 tablet 0    multivit with minerals/lutein (MULTIVITAMIN 50 PLUS ORAL) Take 1 tablet by mouth once daily.      triamterene-hydrochlorothiazid (Maxzide-25) 37.5-25 mg tablet TAKE 1 TABLET BY MOUTH EVERY DAY 90 tablet 0     No current facility-administered medications for this visit.       Assessment and Plan  Ryann was seen today for follow-up.  Diagnoses and all orders for this visit:  Essential hypertension (Primary)  -     celecoxib (CeleBREX) 100 mg capsule; Take 1 capsule (100 mg) by mouth 2 times " a day.  -     CBC; Future  -     Comprehensive Metabolic Panel; Future  -     TSH with reflex to Free T4 if abnormal; Future  -     Hemoglobin A1C; Future  -     Lipid Panel; Future  -     Vitamin D 25-Hydroxy,Total (for eval of Vitamin D levels); Future  -     Albumin-Creatinine Ratio, Urine Random; Future  Hypertension, unspecified type  -     metoprolol tartrate (Lopressor) 50 mg tablet; Take 1 tablet by mouth 2 times a day.  -     celecoxib (CeleBREX) 100 mg capsule; Take 1 capsule (100 mg) by mouth 2 times a day.  -     CBC; Future  -     Comprehensive Metabolic Panel; Future  -     TSH with reflex to Free T4 if abnormal; Future  -     Hemoglobin A1C; Future  -     Lipid Panel; Future  -     Vitamin D 25-Hydroxy,Total (for eval of Vitamin D levels); Future  -     Albumin-Creatinine Ratio, Urine Random; Future  Pure hypercholesterolemia  -     celecoxib (CeleBREX) 100 mg capsule; Take 1 capsule (100 mg) by mouth 2 times a day.  -     CBC; Future  -     Comprehensive Metabolic Panel; Future  -     TSH with reflex to Free T4 if abnormal; Future  -     Hemoglobin A1C; Future  -     Lipid Panel; Future  -     Vitamin D 25-Hydroxy,Total (for eval of Vitamin D levels); Future  -     Albumin-Creatinine Ratio, Urine Random; Future  Left ventricular hypertrophy by electrocardiogram  -     celecoxib (CeleBREX) 100 mg capsule; Take 1 capsule (100 mg) by mouth 2 times a day.  -     CBC; Future  -     Comprehensive Metabolic Panel; Future  -     TSH with reflex to Free T4 if abnormal; Future  -     Hemoglobin A1C; Future  -     Lipid Panel; Future  -     Vitamin D 25-Hydroxy,Total (for eval of Vitamin D levels); Future  -     Albumin-Creatinine Ratio, Urine Random; Future  Neck pain  -     Referral to Physical Therapy; Future  Vitamin D deficiency  -     celecoxib (CeleBREX) 100 mg capsule; Take 1 capsule (100 mg) by mouth 2 times a day.  -     CBC; Future  -     Comprehensive Metabolic Panel; Future  -     TSH with reflex  to Free T4 if abnormal; Future  -     Hemoglobin A1C; Future  -     Lipid Panel; Future  -     Vitamin D 25-Hydroxy,Total (for eval of Vitamin D levels); Future  -     Albumin-Creatinine Ratio, Urine Random; Future  CLARITZA (obstructive sleep apnea)  Lumbar spondylosis  -     Referral to Physical Therapy; Future  Annual physical exam  -     Referral to Obstetrics / Gynecology; Future  -     BI mammo bilateral screening tomosynthesis; Future  Flu vaccine need  -     Flu vaccine, trivalent, preservative free, age 6 months and greater (Fluarix/Fluzone/Flulaval)  Need for Tdap vaccination  -     Tdap vaccine, age 7 years and older  (BOOSTRIX)     59 year old woman with scoliosis and lumbar spondylosis as well as cervical degenerative changes, history of HTN, HLD, and vitamin D deficiency, is here for a follow up.  On exam she has stiffness of the R paraspinal muscles and her head is tilted to the left, she is c/o pain in the neck and her back and some morning stiffness, she takes ASA few times per week for her pain.  Stop ASA  Start Celebrex PRN  PT referral given  May benefit from massage therapy    Due for complete BW  Due for mammogram and PAP    Flu and tdap given      Immunizations:  Immunization History   Administered Date(s) Administered    Flu vaccine (IIV4), preservative free *Check age/dose* 10/15/2019, 09/11/2020, 10/25/2022    Flu vaccine, trivalent, preservative free, age 6 months and greater (Fluarix/Fluzone/Flulaval) 10/08/2024    MMR vaccine, subcutaneous (MMR II) 11/10/2011    Pfizer COVID-19 vaccine, bivalent, age 12 years and older (30 mcg/0.3 mL) 10/25/2022    Pfizer Purple Cap SARS-CoV-2 03/23/2021, 04/16/2021    Td vaccine, age 7 years and older (TDVAX) 03/01/2001    Tdap vaccine, age 7 year and older (BOOSTRIX, ADACEL) 11/07/2011, 10/08/2024     Please follow up in 3 months

## 2024-10-09 DIAGNOSIS — E78.00 PURE HYPERCHOLESTEROLEMIA: Primary | ICD-10-CM

## 2024-10-09 DIAGNOSIS — E78.00 PURE HYPERCHOLESTEROLEMIA: ICD-10-CM

## 2024-10-09 DIAGNOSIS — I51.7 LEFT VENTRICULAR HYPERTROPHY BY ELECTROCARDIOGRAM: ICD-10-CM

## 2024-10-09 DIAGNOSIS — E55.9 VITAMIN D DEFICIENCY: ICD-10-CM

## 2024-10-09 DIAGNOSIS — I10 HYPERTENSION, UNSPECIFIED TYPE: ICD-10-CM

## 2024-10-09 DIAGNOSIS — I10 ESSENTIAL HYPERTENSION: ICD-10-CM

## 2024-10-09 LAB
ERYTHROCYTE [DISTWIDTH] IN BLOOD BY AUTOMATED COUNT: 12.6 % (ref 11.5–14.5)
EST. AVERAGE GLUCOSE BLD GHB EST-MCNC: 88 MG/DL
HBA1C MFR BLD: 4.7 %
HCT VFR BLD AUTO: 42.9 % (ref 36–46)
HGB BLD-MCNC: 14.3 G/DL (ref 12–16)
MCH RBC QN AUTO: 30.8 PG (ref 26–34)
MCHC RBC AUTO-ENTMCNC: 33.3 G/DL (ref 32–36)
MCV RBC AUTO: 93 FL (ref 80–100)
NRBC BLD-RTO: 0 /100 WBCS (ref 0–0)
PLATELET # BLD AUTO: 255 X10*3/UL (ref 150–450)
RBC # BLD AUTO: 4.64 X10*6/UL (ref 4–5.2)
WBC # BLD AUTO: 7.5 X10*3/UL (ref 4.4–11.3)

## 2024-10-09 RX ORDER — CELECOXIB 100 MG/1
100 CAPSULE ORAL 2 TIMES DAILY
Qty: 60 CAPSULE | Refills: 3 | OUTPATIENT
Start: 2024-10-09 | End: 2025-02-06

## 2024-10-09 RX ORDER — CELECOXIB 100 MG/1
100 CAPSULE ORAL 2 TIMES DAILY
Qty: 60 CAPSULE | Refills: 3 | Status: SHIPPED | OUTPATIENT
Start: 2024-10-09 | End: 2025-02-06

## 2024-11-04 ENCOUNTER — APPOINTMENT (OUTPATIENT)
Dept: OBSTETRICS AND GYNECOLOGY | Facility: CLINIC | Age: 59
End: 2024-11-04
Payer: COMMERCIAL

## 2024-11-04 VITALS
SYSTOLIC BLOOD PRESSURE: 120 MMHG | DIASTOLIC BLOOD PRESSURE: 76 MMHG | BODY MASS INDEX: 31.02 KG/M2 | HEIGHT: 60 IN | WEIGHT: 158 LBS

## 2024-11-04 DIAGNOSIS — Z00.00 ANNUAL PHYSICAL EXAM: ICD-10-CM

## 2024-11-04 DIAGNOSIS — D25.9 UTERINE LEIOMYOMA, UNSPECIFIED LOCATION: Primary | ICD-10-CM

## 2024-11-04 PROCEDURE — 99396 PREV VISIT EST AGE 40-64: CPT | Performed by: NURSE PRACTITIONER

## 2024-11-04 PROCEDURE — 3008F BODY MASS INDEX DOCD: CPT | Performed by: NURSE PRACTITIONER

## 2024-11-04 PROCEDURE — 1036F TOBACCO NON-USER: CPT | Performed by: NURSE PRACTITIONER

## 2024-11-04 PROCEDURE — 3074F SYST BP LT 130 MM HG: CPT | Performed by: NURSE PRACTITIONER

## 2024-11-04 PROCEDURE — 3078F DIAST BP <80 MM HG: CPT | Performed by: NURSE PRACTITIONER

## 2024-11-04 SDOH — ECONOMIC STABILITY: FOOD INSECURITY: WITHIN THE PAST 12 MONTHS, THE FOOD YOU BOUGHT JUST DIDN'T LAST AND YOU DIDN'T HAVE MONEY TO GET MORE.: NEVER TRUE

## 2024-11-04 SDOH — ECONOMIC STABILITY: FOOD INSECURITY: WITHIN THE PAST 12 MONTHS, YOU WORRIED THAT YOUR FOOD WOULD RUN OUT BEFORE YOU GOT MONEY TO BUY MORE.: NEVER TRUE

## 2024-11-04 SDOH — ECONOMIC STABILITY: TRANSPORTATION INSECURITY
IN THE PAST 12 MONTHS, HAS LACK OF TRANSPORTATION KEPT YOU FROM MEETINGS, WORK, OR FROM GETTING THINGS NEEDED FOR DAILY LIVING?: NO

## 2024-11-04 SDOH — ECONOMIC STABILITY: TRANSPORTATION INSECURITY
IN THE PAST 12 MONTHS, HAS THE LACK OF TRANSPORTATION KEPT YOU FROM MEDICAL APPOINTMENTS OR FROM GETTING MEDICATIONS?: NO

## 2024-11-04 ASSESSMENT — ENCOUNTER SYMPTOMS
CONSTIPATION: 1
OCCASIONAL FEELINGS OF UNSTEADINESS: 0
LOSS OF SENSATION IN FEET: 0
DEPRESSION: 0

## 2024-11-04 ASSESSMENT — PAIN SCALES - GENERAL: PAINLEVEL_OUTOF10: 0-NO PAIN

## 2024-11-04 ASSESSMENT — PATIENT HEALTH QUESTIONNAIRE - PHQ9
SUM OF ALL RESPONSES TO PHQ9 QUESTIONS 1 & 2: 0
2. FEELING DOWN, DEPRESSED OR HOPELESS: NOT AT ALL
1. LITTLE INTEREST OR PLEASURE IN DOING THINGS: NOT AT ALL

## 2024-11-04 NOTE — PROGRESS NOTES
Geena Roblero, APRN-CNP     Subjective   Ryann Alcaraz is a 59 y.o. female who presents for annual exam.   Patient is a 59-year-old  new to the practice here for an annual exam.    Patient was previously diagnosed with fibroid tumors and reports she was told to keep an eye on these.  She denies any pelvic pain any vaginal bleeding any urinary problems but does endorse having some constipation.    Patient is not sexually active and has never been sexually active.  She does hold out hope of 1 day getting .  Symptoms:  No Menopausal Symptoms    Past Medical History:   Diagnosis Date    Abdominal distension (gaseous) 2018    Bloating    Abnormal results of liver function studies 2018    Abnormal liver function    Abnormal weight gain 2015    Abnormal weight gain    Body mass index (BMI) 29.0-29.9, adult 2022    Body mass index (BMI) of 29.0 to 29.9 in adult    Fibroid     Flat foot (pes planus) (acquired), unspecified foot 2018    Pes planus    Hypersomnia, unspecified 2015    Excessive sleepiness    Localized edema 2014    Pedal edema    Localized edema 2018    Leg edema, right    Other conditions influencing health status 2015    Stiffness of extremity    Other spondylosis with myelopathy, cervical region     Cervical spondylosis with myelopathy    Pain in right foot 2015    Pain in both feet    Pain in right foot 2018    Right foot pain    Pain in right knee 2018    Acute pain of right knee    Plantar fascial fibromatosis 2015    Plantar fasciitis    Posterior tibial tendinitis, unspecified leg 2014    Posterior tibial tendonitis    Radiculopathy, lumbar region 2018    Lumbar radiculopathy    S/P right breast biopsy 2009    Right benign excisional biopsy , ,     Scoliosis, unspecified     Scoliosis    Unspecified injury of right foot, initial encounter 2018    Right foot injury, initial  encounter    Vitamin D deficiency, unspecified 2013    Vitamin D deficiency     Past Surgical History:   Procedure Laterality Date    BACK SURGERY  2016    Back Surgery    BREAST BIOPSY  2013    Biopsy Breast Percutaneous Needle Core    CERVICAL DISCECTOMY  2013    Spinal Diskectomy Cervical    COLONOSCOPY  2019    Complete Colonoscopy    OTHER SURGICAL HISTORY  2014    Thyroglossal Duct Cyst Excision       OB History          0    Para   0    Term   0       0    AB   0    Living   0         SAB   0    IAB   0    Ectopic   0    Multiple   0    Live Births   0               No LMP recorded (lmp unknown). Patient is postmenopausal.    Review of Systems   Gastrointestinal:  Positive for constipation.   All other systems reviewed and are negative.    Breast: No Complaints   Vaginal: No Complaints          Objective   /76   Ht 1.524 m (5')   Wt 71.7 kg (158 lb)   LMP  (LMP Unknown)   BMI 30.86 kg/m²   Physical Exam  Constitutional:       Appearance: Normal appearance. She is normal weight.   Genitourinary:      Vulva, rectum and urethral meatus normal.      Right Labia: No rash.     Left Labia: No rash.     No vaginal discharge.      No vaginal prolapse present.     Moderate vaginal atrophy present.       Right Adnexa: no mass present.     Left Adnexa: no mass present.     Cervix is nulliparous.      No cervical motion tenderness.      Uterus is enlarged and irregular.   Cardiovascular:      Rate and Rhythm: Normal rate.      Heart sounds: Normal heart sounds.   Pulmonary:      Effort: Pulmonary effort is normal.      Breath sounds: Normal breath sounds.   Abdominal:      Palpations: Abdomen is soft.   Musculoskeletal:         General: Normal range of motion.      Cervical back: Normal range of motion.   Neurological:      General: No focal deficit present.      Mental Status: She is alert.   Skin:     General: Skin is warm and dry.   Vitals and nursing note  reviewed.                Assessment/Plan   Problem List Items Addressed This Visit             ICD-10-CM    Annual physical exam Z00.00     Other Visit Diagnoses         Codes    Uterine leiomyoma, unspecified location    -  Primary D25.9

## 2024-11-11 ENCOUNTER — HOSPITAL ENCOUNTER (OUTPATIENT)
Dept: RADIOLOGY | Facility: CLINIC | Age: 59
Discharge: HOME | End: 2024-11-11
Payer: COMMERCIAL

## 2024-11-11 VITALS — WEIGHT: 158.07 LBS | BODY MASS INDEX: 31.03 KG/M2 | HEIGHT: 60 IN

## 2024-11-11 DIAGNOSIS — Z00.00 ANNUAL PHYSICAL EXAM: ICD-10-CM

## 2024-11-11 PROCEDURE — 77067 SCR MAMMO BI INCL CAD: CPT

## 2024-11-11 PROCEDURE — 77063 BREAST TOMOSYNTHESIS BI: CPT | Performed by: RADIOLOGY

## 2024-11-11 PROCEDURE — 77067 SCR MAMMO BI INCL CAD: CPT | Performed by: RADIOLOGY

## 2024-11-18 ENCOUNTER — EVALUATION (OUTPATIENT)
Dept: PHYSICAL THERAPY | Facility: CLINIC | Age: 59
End: 2024-11-18
Payer: COMMERCIAL

## 2024-11-18 DIAGNOSIS — M48.02 CERVICAL STENOSIS OF SPINAL CANAL: ICD-10-CM

## 2024-11-18 DIAGNOSIS — M47.816 LUMBAR SPONDYLOSIS: Primary | ICD-10-CM

## 2024-11-18 DIAGNOSIS — M54.2 NECK PAIN: ICD-10-CM

## 2024-11-18 PROBLEM — M54.50 LOW BACK PAIN: Status: RESOLVED | Noted: 2023-05-11 | Resolved: 2024-11-18

## 2024-11-18 PROCEDURE — 97110 THERAPEUTIC EXERCISES: CPT | Mod: GP | Performed by: PHYSICAL THERAPIST

## 2024-11-18 PROCEDURE — 97161 PT EVAL LOW COMPLEX 20 MIN: CPT | Mod: GP | Performed by: PHYSICAL THERAPIST

## 2024-11-18 ASSESSMENT — ENCOUNTER SYMPTOMS
OCCASIONAL FEELINGS OF UNSTEADINESS: 1
LOSS OF SENSATION IN FEET: 0
DEPRESSION: 0

## 2024-11-18 NOTE — PROGRESS NOTES
Physical Therapy    Physical Therapy Evaluation and Treatment      Patient Name: Ryann Alcaraz  MRN: [id number]  Today's Date: 11/18/2024  Visit: 1  Insurance: Reviewed  Physician: Rachel Cerrato  Problem List Items Addressed This Visit             ICD-10-CM    Cervical stenosis of spinal canal M48.02    Lumbar spondylosis - Primary M47.816     Other Visit Diagnoses         Codes    Neck pain     M54.2             Time Entry:   Time Calculation  Start Time: 0455  Stop Time: 0545  Time Calculation (min): 50 min  PT Evaluation Time Entry  PT Evaluation (Low) Time Entry: 25  PT Therapeutic Procedures Time Entry  Therapeutic Exercise Time Entry: 25                   Assessment: Patient seen in PT for Initial Evaluation for back an neck pain due to oa.   Patient presents with postural deviation  decreased neck, shoulder and back ROM , decreased hip, neck and core strength, lower cervical  tenderness, decreased balance.  Functionally, patient  unable to walk distances or do strenuous ADL's and limited neck ROM.  Patient rates NDI  at 8.9%, oswestry = 14%.  No recent back or neck imaging.     PT Assessment  Rehab Prognosis: Good  Evaluation/Treatment Tolerance: Patient tolerated treatment well     Plan:  Continue with POC  Pulleys/Seated stepper, neck and back stretches, postural correction exercises, core strength, hip strength, shoulder ROM, posture/body mechanics, balance  OP PT Plan  PT Plan: Skilled PT  PT Frequency: 1 time per week  Duration: 6  Onset Date: 10/08/24  Rehab Potential: Good  Plan of Care Agreement: Patient    Current Problem:   1. Lumbar spondylosis  Referral to Physical Therapy      2. Cervical stenosis of spinal canal        3. Neck pain  Referral to Physical Therapy          Subjective    General: Patient with a history of worsening of neck stiffness for the last year - back pain worse of the 6 months.  Onset was insidious onset.  Patient treated with ASA PRN.  Patient complains of pain in the  region of the lower cervical columns and ls junction, stiffness/ache pain, 3-4/10 at worst last 7 days. Back is worse then neck.   Patient's pain is worse with neck rotation, back pain with standing - walking.  Functionally, patient is unable to walk long distances (3 - 5 minutes), avoid bending, no  strenuous ADL's.       Precautions: HTN  Precautions  STEADI Fall Risk Score (The score of 4 or more indicates an increased risk of falling): 3  Prior Level of Function: able to do all ADL's 2023       Objective     Postural deviation: FH, hiked right shoulder, thoracic kyphosis  cervical ROM to 20 flex, 25 ext, 35 right SB,  20 left SB, 25 right rotation, and 30 left rotation  neck strength to 4/5 flexion, 4/5 extension, 4/5 right SB, and 4/5 left SB.  Shoulder ROM: 130 left shoulder elevation, 140 right shoulder elevation - all others WFL  UE strength: shoulder strength 4/5 bilaterally  Tender to palpation at the lower cervical facets  Posture: right thoracic, left lumbar scoliosis, flexed at waist  lumbar ROM to 40 flex, 15 ext, 10 right SB, and 10 left SB.  abdominal strength to poor and back extensor strength to poor.  LE ROM: WFL  LE strength: right hip abd 3+/5, right hip flexion 4/5, right hip extension 3+/5, left hip 5/5  Tender to palpation at the left lumbar paraspinal -spasm  TUG = 11.65 seconds    Outcome Measures:  Other Measures  Neck Disability Index: 4  Oswestry Disablity Index (TAMAR): 7     Treatments: Patient instructed in HEP including: ball squeezes, hip abd/er with green theraband, SAQ, HLR, and PT 20X each, and supine cervical rotation, chin tuck, shoulder retraction 20X, and wand assisted shoulder flexion, elevation/er 10X each.   (25 minutes)    EDUCATION: HEP       Goals:  Active       PT Problem       PT Goal 1       Start:  11/18/24    Expected End:  02/16/25       Neck and back pain 2/10 or less         PT Goal 2       Start:  11/18/24    Expected End:  02/16/25       Improve NDI and oswestry  by 5%         PT Goal 3       Start:  11/18/24    Expected End:  02/16/25       Maximize cervical, shoulder and lumbar ROM         PT Goal 4       Start:  11/18/24    Expected End:  02/16/25       Abdominal strength to good  Right hip strength to 4/5         PT Goal 5       Start:  11/18/24    Expected End:  02/16/25       TUG less then 10 sec

## 2024-12-13 ENCOUNTER — TREATMENT (OUTPATIENT)
Dept: PHYSICAL THERAPY | Facility: CLINIC | Age: 59
End: 2024-12-13
Payer: COMMERCIAL

## 2024-12-13 DIAGNOSIS — M47.816 LUMBAR SPONDYLOSIS: ICD-10-CM

## 2024-12-13 DIAGNOSIS — M54.2 NECK PAIN: ICD-10-CM

## 2024-12-13 DIAGNOSIS — M48.02 CERVICAL STENOSIS OF SPINAL CANAL: ICD-10-CM

## 2024-12-13 PROCEDURE — 97110 THERAPEUTIC EXERCISES: CPT | Mod: GP,CQ

## 2024-12-13 ASSESSMENT — PAIN - FUNCTIONAL ASSESSMENT: PAIN_FUNCTIONAL_ASSESSMENT: 0-10

## 2024-12-13 ASSESSMENT — PAIN SCALES - GENERAL: PAINLEVEL_OUTOF10: 3

## 2024-12-13 NOTE — PROGRESS NOTES
Physical Therapy    Physical Therapy Treatment    Patient Name: Ryann Alcaraz  MRN: [id number]  Today's Date: 12/13/2024  Time Entry:   Time Calculation  Start Time: 0100  Stop Time: 0145  Time Calculation (min): 45 min     PT Therapeutic Procedures Time Entry  Therapeutic Exercise Time Entry: 45                 Visit: 2  Insurance: Reviewed  Physician: Rachel Cerrato    Assessment:  PT Assessment  Evaluation/Treatment Tolerance: Patient tolerated treatment well  No reported increases of pain today. Grasp of therex and understanding is good. Enjoys seated trunk flexions with theraball. Core strengthening added today; cuing to maintain good posture and core contractions. Patient feeling well when leaving.     Plan:   Continue with POC  Pulleys/Seated stepper, neck and back stretches, postural correction exercises, core strength, hip strength, shoulder ROM, posture/body mechanics, balance  OP PT Plan  PT Plan: Skilled PT  PT Frequency: 1 time per week  Duration: 6  Onset Date: 10/08/24  Rehab Potential: Good  Plan of Care Agreement: Patient    Current Problem  1. Lumbar spondylosis  Follow Up In Physical Therapy      2. Cervical stenosis of spinal canal  Follow Up In Physical Therapy      3. Neck pain  Follow Up In Physical Therapy      General        Subjective    Pain in center of low back 3/10  I don't usually have pain in the neck, its usually stiffness and tightness  HEP completed three days a week    Pain  Pain Assessment  Pain Assessment: 0-10  0-10 (Numeric) Pain Score: 3    Objective     Treatments:  Therapeutic Exercise  Therapeutic Exercise Performed: Yes  Stepper x 7 min  Pulleys x 3 min  Rowing with TA hold GTB x 20  Seated thera ball trunk flexion x 20  Seated hip adduction with TA hold 20 x 5 sec (posture correction prior)  Seated hip abduction with GTB  and TA contraction x 20  UT/Levator stretching 3 x 30 sec  LTR x 30  Supine cervical rotations x 20  Supine cervical chin tuck x 20  Supine  cervical retraction x 20  Supine cane flexion x 15  Supine horizontal abd YTB x 20    OP EDUCATION:     Goals:  Active       PT Problem       PT Goal 1       Start:  11/18/24    Expected End:  02/16/25       Neck and back pain 2/10 or less         PT Goal 2       Start:  11/18/24    Expected End:  02/16/25       Improve NDI and oswestry by 5%         PT Goal 3       Start:  11/18/24    Expected End:  02/16/25       Maximize cervical, shoulder and lumbar ROM         PT Goal 4       Start:  11/18/24    Expected End:  02/16/25       Abdominal strength to good  Right hip strength to 4/5         PT Goal 5       Start:  11/18/24    Expected End:  02/16/25       TUG less then 10 sec

## 2024-12-19 ENCOUNTER — APPOINTMENT (OUTPATIENT)
Dept: PHYSICAL THERAPY | Facility: CLINIC | Age: 59
End: 2024-12-19
Payer: COMMERCIAL

## 2024-12-26 ENCOUNTER — APPOINTMENT (OUTPATIENT)
Dept: PHYSICAL THERAPY | Facility: CLINIC | Age: 59
End: 2024-12-26
Payer: COMMERCIAL

## 2024-12-29 DIAGNOSIS — I10 HYPERTENSION, UNSPECIFIED TYPE: ICD-10-CM

## 2024-12-29 RX ORDER — TRIAMTERENE/HYDROCHLOROTHIAZID 37.5-25 MG
1 TABLET ORAL DAILY
Qty: 90 TABLET | Refills: 0 | Status: SHIPPED | OUTPATIENT
Start: 2024-12-29

## 2024-12-30 ENCOUNTER — TREATMENT (OUTPATIENT)
Dept: PHYSICAL THERAPY | Facility: CLINIC | Age: 59
End: 2024-12-30
Payer: COMMERCIAL

## 2024-12-30 DIAGNOSIS — M47.816 LUMBAR SPONDYLOSIS: ICD-10-CM

## 2024-12-30 DIAGNOSIS — M48.02 CERVICAL STENOSIS OF SPINAL CANAL: ICD-10-CM

## 2024-12-30 DIAGNOSIS — M54.2 NECK PAIN: ICD-10-CM

## 2024-12-30 PROCEDURE — 97110 THERAPEUTIC EXERCISES: CPT | Mod: GP | Performed by: PHYSICAL THERAPIST

## 2024-12-30 NOTE — PROGRESS NOTES
Physical Therapy    Physical Therapy Treatment    Patient Name: Ryann Alcaraz  MRN: [id number]  Today's Date: 12/30/2024  Time Entry:   Time Calculation  Start Time: 0520  Stop Time: 0600  Time Calculation (min): 40 min  PT Therapeutic Procedures Time Entry  Therapeutic Exercise Time Entry: 40  Visit: 3  Insurance: Reviewed  Physician: Rachel Cerrato    Assessment: Patient continues to have moderate pain level to 4/10.  Patient advised to use HP for pain.          Plan:   Continue with POC  Pulleys/Seated stepper, neck and back stretches, postural correction exercises, core strength, hip strength, shoulder ROM, posture/body mechanics, balance  OP PT Plan  PT Plan: Skilled PT  PT Frequency: 1 time per week  Duration: 6  Onset Date: 10/08/24  Rehab Potential: Good  Plan of Care Agreement: Patient    Current Problem  1. Lumbar spondylosis  Follow Up In Physical Therapy      2. Cervical stenosis of spinal canal  Follow Up In Physical Therapy      3. Neck pain  Follow Up In Physical Therapy      General        Subjective    Pain in center of low back 4/10  Pain after about 5 minutes of standing  Pain with walking but it works out as she goes     Pain       Objective     Precautions: HTN    Treatments:     Stepper x 8 min  Pulleys x 3 min  Theraball KTC with strap and hip abd/add 20X each   Theraball bilateral KTC, HLR, and PT 20X   Supine cervical rotations with OP x 10  Seated cervical chin tuck x 20  Seated cervical SB 10X each  Resisted shoulder retraction and extension with yellow theraband 20X each  (40 minutes)      OP EDUCATION: HEP     Goals:  Active       PT Problem       PT Goal 1       Start:  11/18/24    Expected End:  02/16/25       Neck and back pain 2/10 or less         PT Goal 2       Start:  11/18/24    Expected End:  02/16/25       Improve NDI and oswestry by 5%         PT Goal 3       Start:  11/18/24    Expected End:  02/16/25       Maximize cervical, shoulder and lumbar ROM         PT Goal 4        Start:  11/18/24    Expected End:  02/16/25       Abdominal strength to good  Right hip strength to 4/5         PT Goal 5       Start:  11/18/24    Expected End:  02/16/25       TUG less then 10 sec

## 2025-01-03 ENCOUNTER — APPOINTMENT (OUTPATIENT)
Dept: PRIMARY CARE | Facility: CLINIC | Age: 60
End: 2025-01-03
Payer: COMMERCIAL

## 2025-01-03 ENCOUNTER — HOSPITAL ENCOUNTER (OUTPATIENT)
Dept: RADIOLOGY | Facility: HOSPITAL | Age: 60
Discharge: HOME | End: 2025-01-03
Payer: COMMERCIAL

## 2025-01-03 VITALS
DIASTOLIC BLOOD PRESSURE: 85 MMHG | HEIGHT: 60 IN | WEIGHT: 158 LBS | BODY MASS INDEX: 31.02 KG/M2 | HEART RATE: 86 BPM | SYSTOLIC BLOOD PRESSURE: 121 MMHG

## 2025-01-03 DIAGNOSIS — E78.00 PURE HYPERCHOLESTEROLEMIA: Primary | ICD-10-CM

## 2025-01-03 DIAGNOSIS — I10 ESSENTIAL HYPERTENSION: ICD-10-CM

## 2025-01-03 DIAGNOSIS — E55.9 VITAMIN D DEFICIENCY: ICD-10-CM

## 2025-01-03 DIAGNOSIS — E78.00 PURE HYPERCHOLESTEROLEMIA: ICD-10-CM

## 2025-01-03 PROCEDURE — 99214 OFFICE O/P EST MOD 30 MIN: CPT | Performed by: INTERNAL MEDICINE

## 2025-01-03 PROCEDURE — 3074F SYST BP LT 130 MM HG: CPT | Performed by: INTERNAL MEDICINE

## 2025-01-03 PROCEDURE — 1036F TOBACCO NON-USER: CPT | Performed by: INTERNAL MEDICINE

## 2025-01-03 PROCEDURE — 3008F BODY MASS INDEX DOCD: CPT | Performed by: INTERNAL MEDICINE

## 2025-01-03 PROCEDURE — 75571 CT HRT W/O DYE W/CA TEST: CPT

## 2025-01-03 PROCEDURE — 3079F DIAST BP 80-89 MM HG: CPT | Performed by: INTERNAL MEDICINE

## 2025-01-03 RX ORDER — ROSUVASTATIN CALCIUM 10 MG/1
10 TABLET, COATED ORAL DAILY
Qty: 90 TABLET | Refills: 0 | Status: SHIPPED | OUTPATIENT
Start: 2025-01-03 | End: 2026-02-07

## 2025-01-03 ASSESSMENT — ENCOUNTER SYMPTOMS
CARDIOVASCULAR NEGATIVE: 1
RESPIRATORY NEGATIVE: 1
GASTROINTESTINAL NEGATIVE: 1
CONSTITUTIONAL NEGATIVE: 1

## 2025-01-03 ASSESSMENT — LIFESTYLE VARIABLES
AUDIT-C TOTAL SCORE: 0
HOW OFTEN DO YOU HAVE SIX OR MORE DRINKS ON ONE OCCASION: NEVER
HOW MANY STANDARD DRINKS CONTAINING ALCOHOL DO YOU HAVE ON A TYPICAL DAY: PATIENT DOES NOT DRINK
HOW OFTEN DO YOU HAVE A DRINK CONTAINING ALCOHOL: NEVER
SKIP TO QUESTIONS 9-10: 1

## 2025-01-03 ASSESSMENT — PATIENT HEALTH QUESTIONNAIRE - PHQ9
2. FEELING DOWN, DEPRESSED OR HOPELESS: NOT AT ALL
SUM OF ALL RESPONSES TO PHQ9 QUESTIONS 1 AND 2: 0
1. LITTLE INTEREST OR PLEASURE IN DOING THINGS: NOT AT ALL

## 2025-01-03 NOTE — PROGRESS NOTES
Chief Complaint:   Chief Complaint   Patient presents with    Follow-up        Ryann Alcaraz is a 59 y.o. year old female is here for follow-up.   HPI   Ryann Alcaraz is here for follow up on chronic conditions.  Reports no new issues.  Compliant with medications.  Denies side effects.  Needs refills on medications.  Chart reviewed, pharmacy updated, prior notes, labs, imaging, EKGs reviewed.    Past Medical History   Past Medical History:   Diagnosis Date    Abdominal distension (gaseous) 06/02/2018    Bloating    Abnormal results of liver function studies 06/09/2018    Abnormal liver function    Abnormal weight gain 12/12/2015    Abnormal weight gain    Body mass index (BMI) 29.0-29.9, adult 01/07/2022    Body mass index (BMI) of 29.0 to 29.9 in adult    Fibroid     Flat foot (pes planus) (acquired), unspecified foot 11/26/2018    Pes planus    Hypersomnia, unspecified 03/24/2015    Excessive sleepiness    Localized edema 09/30/2014    Pedal edema    Localized edema 11/26/2018    Leg edema, right    Other conditions influencing health status 12/12/2015    Stiffness of extremity    Other spondylosis with myelopathy, cervical region     Cervical spondylosis with myelopathy    Pain in right foot 12/12/2015    Pain in both feet    Pain in right foot 11/21/2018    Right foot pain    Pain in right knee 11/12/2018    Acute pain of right knee    Plantar fascial fibromatosis 12/12/2015    Plantar fasciitis    Posterior tibial tendinitis, unspecified leg 11/24/2014    Posterior tibial tendonitis    Radiculopathy, lumbar region 08/31/2018    Lumbar radiculopathy    S/P right breast biopsy 01/27/2009    Right benign excisional biopsy 1998, 2004, 2009    Scoliosis, unspecified     Scoliosis    Unspecified injury of right foot, initial encounter 11/16/2018    Right foot injury, initial encounter    Vitamin D deficiency, unspecified 06/20/2013    Vitamin D deficiency      Past Surgical History:   Past Surgical History:    Procedure Laterality Date    BACK SURGERY  11/20/2016    Back Surgery    BREAST BIOPSY  06/19/2013    Biopsy Breast Percutaneous Needle Core    CERVICAL DISCECTOMY  06/19/2013    Spinal Diskectomy Cervical    COLONOSCOPY  05/30/2019    Complete Colonoscopy    OTHER SURGICAL HISTORY  02/24/2014    Thyroglossal Duct Cyst Excision     Family History:   Family History   Problem Relation Name Age of Onset    Breast cancer Father's Sister Yeimy 80    Arthritis Mother Judith Alcaraz     Blood clot Mother Judith Alcaraz     Cancer Mother Judith Alcaraz     Hypertension Mother Judith Alcaraz     Kidney disease Mother Judith Alcaraz     Stroke Mother Judith Alcaraz     Heart disease Father Buzz     Arthritis Sister Schyrle     Diabetes Sister Schyrle     Hypertension Sister Schyrle     Atrial fibrillation Brother Taz     Heart disease Brother Taz     Hypertension Brother Taz     Cancer Sister Rachel     Heart disease Brother Ayden     Hypertension Brother Ayden     Heart disease Brother Abdiel      Social History:   Tobacco Use: Low Risk  (1/3/2025)    Patient History     Smoking Tobacco Use: Never     Smokeless Tobacco Use: Never     Passive Exposure: Never      Social History     Substance and Sexual Activity   Alcohol Use Never        Allergies:   Allergies   Allergen Reactions    Dicloxacillin Unknown    Duloxetine Diarrhea, Nausea Only, Unknown and Nausea/vomiting    Tramadol Nausea Only, Unknown and Nausea/vomiting        ROS   Review of Systems   Constitutional: Negative.    Respiratory: Negative.     Cardiovascular: Negative.    Gastrointestinal: Negative.         Objective   Vitals:    01/03/25 1407   BP: 121/85   Pulse: 86      BMI Readings from Last 15 Encounters:   01/03/25 30.86 kg/m²   11/11/24 30.87 kg/m²   11/04/24 30.86 kg/m²   10/08/24 29.26 kg/m²   06/12/24 29.08 kg/m²   03/05/24 28.53 kg/m²   02/16/24 29.26 kg/m²   11/10/23 29.08 kg/m²   10/10/23 29.27 kg/m²   05/12/23 28.35 kg/m²   01/16/23  "28.90 kg/m²   11/03/22 29.26 kg/m²   10/10/22 29.52 kg/m²   08/29/22 28.53 kg/m²   07/19/22 28.90 kg/m²      71.7 kg (158 lb) (1/3/2025  2:07 PM)      Physical Exam  Physical Exam  Neurological:      Mental Status: She is alert.   Psychiatric:         Mood and Affect: Mood normal.          Labs:  CBC:  Recent Labs     10/08/24  1104 11/10/23  0952 10/10/22  1016   WBC 7.5 7.5 8.5   HGB 14.3 13.8 14.2   HCT 42.9 42.4 43.2    300 278   MCV 93 96 96     CMP:  Recent Labs     10/08/24  1104 06/12/24  1458 11/10/23  0952    140 140   K 3.9 4.2 4.6    102 101   CO2 29 29 33*   ANIONGAP 15 13 11   BUN 20 14 18   CREATININE 0.98 0.87 0.93   EGFR 66 76 71   GLUCOSE 80 89 74     Recent Labs     10/08/24  1104 06/12/24  1458 11/10/23  0952   ALBUMIN 4.1 4.2 4.2   ALKPHOS 71 96 108   ALT 17 24 35   AST 18 23 30   BILITOT 0.7 0.9 0.6     Calcium/Phos:   Lab Results   Component Value Date    CALCIUM 10.0 10/08/2024      COAG: No results for input(s): \"INR\", \"DDIMERVTE\" in the last 86985 hours.  CRP:   Lab Results   Component Value Date    CRP 0.31 11/12/2018      [unfilled]   ENDO:  Recent Labs     10/08/24  1104 11/10/23  0952 10/10/22  1016 01/07/22  1220   TSH 1.58 1.87 1.30 0.98   HGBA1C 4.7 4.9 4.9 5.6      CARDIAC: No results for input(s): \"LDH\", \"CKMB\", \"TROPHS\", \"BNP\" in the last 43425 hours.    No lab exists for component: \"CK\", \"CKMBP\"  Recent Labs     10/08/24  1104 11/10/23  0952 05/12/23  1151 10/10/22  1016 07/18/22  0958   CHOL 241* 205* 210* 194 179   LDLF  --   --  133* 112* 91   LDLCALC 157* 121*  --   --   --    HDL 71.5 71.2 62.2 70.4 75.6   TRIG 63 64 74 56 60     No data recorded    Current Medications:  Current Outpatient Medications   Medication Sig Dispense Refill    celecoxib (CeleBREX) 100 mg capsule Take 1 capsule (100 mg) by mouth 2 times a day. 60 capsule 3    cholecalciferol (Vitamin D-3) 25 MCG (1000 UT) tablet Take 1 tablet (25 mcg) by mouth once daily.      metoprolol " tartrate (Lopressor) 50 mg tablet Take 1 tablet by mouth 2 times a day. 180 tablet 0    multivit with minerals/lutein (MULTIVITAMIN 50 PLUS ORAL) Take 1 tablet by mouth once daily.      rosuvastatin (Crestor) 10 mg tablet Take 1 tablet (10 mg) by mouth once daily. 90 tablet 0    triamterene-hydrochlorothiazid (Maxzide-25) 37.5-25 mg tablet TAKE 1 TABLET BY MOUTH EVERY DAY 90 tablet 0     No current facility-administered medications for this visit.       Assessment and Plan  Ryann was seen today for follow-up.  Diagnoses and all orders for this visit:  Pure hypercholesterolemia (Primary)  -     rosuvastatin (Crestor) 10 mg tablet; Take 1 tablet (10 mg) by mouth once daily.  Essential hypertension  Vitamin D deficiency        HTN.  Well controlled.  Continue with current medications.  Check BP at home daily.  Report to us if the numbers are higher than 130/80.    The patient and I have had an extensive discussion regarding the importance of blood pressure management and control as well as the importance of following up with us, taking medications appropriately and following up on medical advice.   Patient expressed understanding and acknowledges importance of compliance.     HLD  Went up since the discontinuation of Atorvastatin  She was having some muscle aches from it  Recommend starting Rosuvastatin 10 mg daily  Should be well tolerated  Repeat levels at the next visit    Low vitamin D level  Double the dose of current OTC vitamin D    By optimizing your health through good nutrition, daily exercise, stress management, low/moderate alcohol and avoidance of tobacco, sugar and processed foods, you can help to decrease your risk of cardiovascular disease and stroke and achieve a healthy weight. A diet rich in whole, plant-based foods such as vegetables, beans, seeds, nuts, whole grains, healthy fats and fruit - leads to lower body mass index, blood pressure, HbA1C, and cholesterol levels.          Immunizations:  Immunization History   Administered Date(s) Administered    Flu vaccine (IIV4), preservative free *Check age/dose* 10/15/2019, 09/11/2020, 10/25/2022    Flu vaccine, trivalent, preservative free, age 6 months and greater (Fluarix/Fluzone/Flulaval) 10/08/2024    MMR vaccine, subcutaneous (MMR II) 11/10/2011    Pfizer COVID-19 vaccine, bivalent, age 12 years and older (30 mcg/0.3 mL) 10/25/2022    Pfizer Purple Cap SARS-CoV-2 03/23/2021, 04/16/2021    Td vaccine, age 7 years and older (TDVAX) 03/01/2001    Tdap vaccine, age 7 year and older (BOOSTRIX, ADACEL) 11/07/2011, 10/08/2024

## 2025-01-06 ENCOUNTER — TREATMENT (OUTPATIENT)
Dept: PHYSICAL THERAPY | Facility: CLINIC | Age: 60
End: 2025-01-06
Payer: COMMERCIAL

## 2025-01-06 DIAGNOSIS — M47.816 LUMBAR SPONDYLOSIS: ICD-10-CM

## 2025-01-06 DIAGNOSIS — M48.02 CERVICAL STENOSIS OF SPINAL CANAL: ICD-10-CM

## 2025-01-06 DIAGNOSIS — M54.2 NECK PAIN: ICD-10-CM

## 2025-01-06 PROCEDURE — 97110 THERAPEUTIC EXERCISES: CPT | Mod: GP | Performed by: PHYSICAL THERAPIST

## 2025-01-06 NOTE — PROGRESS NOTES
Physical Therapy    Physical Therapy Treatment    Patient Name: Ryann Alcaraz  MRN: [id number]  Today's Date: 1/6/2025  Time Entry:   Time Calculation  Start Time: 0530  Stop Time: 0610  Time Calculation (min): 40 min  PT Therapeutic Procedures Time Entry  Therapeutic Exercise Time Entry: 40  Visit: 4  Insurance: Reviewed  Physician: Rachel Cerrato    Assessment: Patient with decreased pain levels with stretching back immediately when she feels discomfort.  Encouraged patient to look for back brace to use when she sits or stands for any period.           Plan:   Continue with POC  Pulleys/Seated stepper, neck and back stretches, postural correction exercises, core strength, hip strength, shoulder ROM, posture/body mechanics, balance  OP PT Plan  PT Plan: Skilled PT  PT Frequency: 1 time per week  Duration: 6  Onset Date: 10/08/24  Rehab Potential: Good  Plan of Care Agreement: Patient    Current Problem  1. Lumbar spondylosis  Follow Up In Physical Therapy      2. Cervical stenosis of spinal canal  Follow Up In Physical Therapy      3. Neck pain  Follow Up In Physical Therapy        General        Subjective    Pain in center of low back 2/10  Patient states she starts to do her ball exercises if she has pain and it gives her relief  Pain after about 5 minutes of standing  Pain with walking but it works out as she goes     Pain       Objective     Precautions: HTN    Treatments:     Stepper x 5 min  Pulleys x 3 min  Theraball KTC with strap and hip abd/add 20X each   Theraball bilateral KTC, HLR, and PT 20X   Ball squeezes 20X  Hip abd/er with green 20X  Bridges 10X   Resisted shoulder retraction and extension with red theraband 20X each  PT 20X  PT with arm lift and PT with arm leg lift 10X and 5X each  (40 minutes)      OP EDUCATION: HEP     Goals:  Active       PT Problem       PT Goal 1       Start:  11/18/24    Expected End:  02/16/25       Neck and back pain 2/10 or less         PT Goal 2       Start:   11/18/24    Expected End:  02/16/25       Improve NDI and oswestry by 5%         PT Goal 3       Start:  11/18/24    Expected End:  02/16/25       Maximize cervical, shoulder and lumbar ROM         PT Goal 4       Start:  11/18/24    Expected End:  02/16/25       Abdominal strength to good  Right hip strength to 4/5         PT Goal 5       Start:  11/18/24    Expected End:  02/16/25       TUG less then 10 sec

## 2025-01-13 ENCOUNTER — TREATMENT (OUTPATIENT)
Dept: PHYSICAL THERAPY | Facility: CLINIC | Age: 60
End: 2025-01-13
Payer: COMMERCIAL

## 2025-01-13 DIAGNOSIS — M47.816 LUMBAR SPONDYLOSIS: ICD-10-CM

## 2025-01-13 DIAGNOSIS — M48.02 CERVICAL STENOSIS OF SPINAL CANAL: ICD-10-CM

## 2025-01-13 DIAGNOSIS — M54.2 NECK PAIN: ICD-10-CM

## 2025-01-13 PROCEDURE — 97110 THERAPEUTIC EXERCISES: CPT | Mod: GP | Performed by: PHYSICAL THERAPIST

## 2025-01-13 NOTE — PROGRESS NOTES
Physical Therapy    Physical Therapy Treatment    Patient Name: Ryann Alcaraz  MRN: [id number]  Today's Date: 1/13/2025  Time Entry:   Time Calculation  Start Time: 0525  Stop Time: 0605  Time Calculation (min): 40 min  PT Therapeutic Procedures Time Entry  Therapeutic Exercise Time Entry: 40  Visit: 5  Insurance: Reviewed  Physician: Rachel Cerrato    Assessment:  Patient seen in PT for 5 visits for back and neck pain.  Patient with slow improvement towards PT goals including:  back pain 2-3/10, improved oswestry to 10%, improved hip and abdominal strength.  Discharge to HEP.       Plan:  Discharge to HEP   Pulleys/Seated stepper, neck and back stretches, postural correction exercises, core strength, hip strength, shoulder ROM, posture/body mechanics, balance  OP PT Plan  PT Plan: Skilled PT  PT Frequency: 1 time per week  Duration: 6  Onset Date: 10/08/24  Rehab Potential: Good  Plan of Care Agreement: Patient    Current Problem  1. Lumbar spondylosis  Follow Up In Physical Therapy      2. Cervical stenosis of spinal canal  Follow Up In Physical Therapy      3. Neck pain  Follow Up In Physical Therapy        General        Subjective    Pain in center of low back 2-3/10      Pain       Objective     TUG = 10.43 sec  Improve hip abd to 4+/5, hip add to 5/5  Good abdominal strength   Oswestry = 10%    Precautions: HTN    Treatments:     Stepper x 10 min  Ball squeezes 20X  Hip abd/er with green 20X  Bridges 10X   Resisted shoulder retraction and extension with red theraband 20X each  Theraball KTC with strap and hip abd/add 20X each   Theraball bilateral KTC, HLR, and PT 20X   PT 20X  Active HS stretch with strap 10X   (40 minutes)      OP EDUCATION: HEP     Goals:  Active       PT Problem       PT Goal 1       Start:  11/18/24    Expected End:  02/16/25       Neck and back pain 2/10 or less         PT Goal 2       Start:  11/18/24    Expected End:  02/16/25       Improve NDI and oswestry by 5%         PT Goal 3        Start:  11/18/24    Expected End:  02/16/25       Maximize cervical, shoulder and lumbar ROM         PT Goal 4       Start:  11/18/24    Expected End:  02/16/25       Abdominal strength to good  Right hip strength to 4/5         PT Goal 5       Start:  11/18/24    Expected End:  02/16/25       TUG less then 10 sec

## 2025-02-28 DIAGNOSIS — I10 HYPERTENSION, UNSPECIFIED TYPE: ICD-10-CM

## 2025-02-28 RX ORDER — TRIAMTERENE/HYDROCHLOROTHIAZID 37.5-25 MG
1 TABLET ORAL DAILY
Qty: 90 TABLET | Refills: 0 | Status: SHIPPED | OUTPATIENT
Start: 2025-02-28

## 2025-02-28 RX ORDER — METOPROLOL TARTRATE 50 MG/1
50 TABLET ORAL 2 TIMES DAILY
Qty: 180 TABLET | Refills: 0 | Status: SHIPPED | OUTPATIENT
Start: 2025-02-28 | End: 2025-05-29

## 2025-04-04 ENCOUNTER — APPOINTMENT (OUTPATIENT)
Dept: PRIMARY CARE | Facility: CLINIC | Age: 60
End: 2025-04-04
Payer: COMMERCIAL

## 2025-04-04 VITALS
BODY MASS INDEX: 30.43 KG/M2 | HEIGHT: 60 IN | HEART RATE: 83 BPM | DIASTOLIC BLOOD PRESSURE: 82 MMHG | WEIGHT: 155 LBS | SYSTOLIC BLOOD PRESSURE: 122 MMHG

## 2025-04-04 DIAGNOSIS — E55.9 VITAMIN D DEFICIENCY: ICD-10-CM

## 2025-04-04 DIAGNOSIS — I10 HYPERTENSION, UNSPECIFIED TYPE: ICD-10-CM

## 2025-04-04 DIAGNOSIS — I10 ESSENTIAL HYPERTENSION: ICD-10-CM

## 2025-04-04 DIAGNOSIS — L60.8 CHANGE IN NAIL APPEARANCE: ICD-10-CM

## 2025-04-04 DIAGNOSIS — E78.5 HYPERLIPIDEMIA, UNSPECIFIED HYPERLIPIDEMIA TYPE: ICD-10-CM

## 2025-04-04 DIAGNOSIS — E78.00 PURE HYPERCHOLESTEROLEMIA: Primary | ICD-10-CM

## 2025-04-04 PROCEDURE — 99214 OFFICE O/P EST MOD 30 MIN: CPT | Performed by: INTERNAL MEDICINE

## 2025-04-04 PROCEDURE — 3079F DIAST BP 80-89 MM HG: CPT | Performed by: INTERNAL MEDICINE

## 2025-04-04 PROCEDURE — 3008F BODY MASS INDEX DOCD: CPT | Performed by: INTERNAL MEDICINE

## 2025-04-04 PROCEDURE — 3074F SYST BP LT 130 MM HG: CPT | Performed by: INTERNAL MEDICINE

## 2025-04-04 PROCEDURE — 1036F TOBACCO NON-USER: CPT | Performed by: INTERNAL MEDICINE

## 2025-04-04 RX ORDER — TRIAMTERENE/HYDROCHLOROTHIAZID 37.5-25 MG
1 TABLET ORAL DAILY
Qty: 90 TABLET | Refills: 0 | Status: SHIPPED | OUTPATIENT
Start: 2025-04-04

## 2025-04-04 ASSESSMENT — LIFESTYLE VARIABLES
HOW MANY STANDARD DRINKS CONTAINING ALCOHOL DO YOU HAVE ON A TYPICAL DAY: PATIENT DOES NOT DRINK
AUDIT-C TOTAL SCORE: 0
HOW OFTEN DO YOU HAVE SIX OR MORE DRINKS ON ONE OCCASION: NEVER
SKIP TO QUESTIONS 9-10: 1
HOW OFTEN DO YOU HAVE A DRINK CONTAINING ALCOHOL: NEVER

## 2025-04-04 NOTE — PROGRESS NOTES
Subjective   Patient ID: Ryann Alcaraz is a 59 y.o. female who presents for Follow-up.    HPI   The patient is here for a follow up on chronic medical problems.  His medications were reviewed, pharmacy updated, notes reviewed, prior labs reviewed.    Review of Systems   Constitutional: Negative.    Respiratory: Negative.     Cardiovascular: Negative.    Gastrointestinal: Negative.        Objective   /82 (BP Location: Left arm, Patient Position: Sitting, BP Cuff Size: Adult)   Pulse 83   Ht 1.524 m (5')   Wt 70.3 kg (155 lb)   LMP  (LMP Unknown)   BMI 30.27 kg/m²     Physical Exam  Neurological:      Mental Status: She is alert.   Psychiatric:         Mood and Affect: Mood normal.         Assessment/Plan   Problem List Items Addressed This Visit          Medium    Essential hypertension    Relevant Orders    CBC    Comprehensive Metabolic Panel    TSH with reflex to Free T4 if abnormal    Hemoglobin A1C    Lipid Panel    Vitamin D 25-Hydroxy,Total (for eval of Vitamin D levels)    Albumin-Creatinine Ratio, Urine Random    Hyperlipidemia - Primary    Relevant Orders    CBC    Comprehensive Metabolic Panel    TSH with reflex to Free T4 if abnormal    Hemoglobin A1C    Lipid Panel    Vitamin D 25-Hydroxy,Total (for eval of Vitamin D levels)    Albumin-Creatinine Ratio, Urine Random    CBC    Comprehensive Metabolic Panel    TSH with reflex to Free T4 if abnormal    Hemoglobin A1C    Lipid Panel    Vitamin D 25-Hydroxy,Total (for eval of Vitamin D levels)    Albumin-Creatinine Ratio, Urine Random    Vitamin D deficiency    Relevant Orders    CBC    Comprehensive Metabolic Panel    TSH with reflex to Free T4 if abnormal    Hemoglobin A1C    Lipid Panel    Vitamin D 25-Hydroxy,Total (for eval of Vitamin D levels)    Albumin-Creatinine Ratio, Urine Random     Other Visit Diagnoses       Change in nail appearance        Relevant Orders    Referral to Podiatry    Hypertension, unspecified type        Relevant  Medications    triamterene-hydrochlorothiazid (Maxzide-25) 37.5-25 mg tablet             HTN.  Well controlled.  Continue with current medications.  Check BP at home daily.  Report to us if the numbers are higher than 130/80.    The patient and I have had an extensive discussion regarding the importance of blood pressure management and control as well as the importance of following up with us, taking medications appropriately and following up on medical advice.   Patient expressed understanding and acknowledges importance of compliance.     HLD  Not taking Rosuvastatin daily, as she feels that it makes her muscles sore and she has some issues with her tongue  Repeat lipids today  CT score zero    Dark toenail  Referral to podiatry placed

## 2025-04-10 LAB
25(OH)D3+25(OH)D2 SERPL-MCNC: 42 NG/ML (ref 30–100)
ALBUMIN SERPL-MCNC: 4.3 G/DL (ref 3.6–5.1)
ALBUMIN/CREAT UR: 6 MG/G CREAT
ALP SERPL-CCNC: 88 U/L (ref 37–153)
ALT SERPL-CCNC: 31 U/L (ref 6–29)
ANION GAP SERPL CALCULATED.4IONS-SCNC: 12 MMOL/L (CALC) (ref 7–17)
AST SERPL-CCNC: 35 U/L (ref 10–35)
BILIRUB SERPL-MCNC: 0.6 MG/DL (ref 0.2–1.2)
BUN SERPL-MCNC: 19 MG/DL (ref 7–25)
CALCIUM SERPL-MCNC: 9.8 MG/DL (ref 8.6–10.4)
CHLORIDE SERPL-SCNC: 103 MMOL/L (ref 98–110)
CHOLEST SERPL-MCNC: 204 MG/DL
CHOLEST/HDLC SERPL: 2.9 (CALC)
CO2 SERPL-SCNC: 24 MMOL/L (ref 20–32)
CREAT SERPL-MCNC: 0.88 MG/DL (ref 0.5–1.05)
CREAT UR-MCNC: 108 MG/DL (ref 20–275)
EGFRCR SERPLBLD CKD-EPI 2021: 75 ML/MIN/1.73M2
ERYTHROCYTE [DISTWIDTH] IN BLOOD BY AUTOMATED COUNT: 11.9 % (ref 11–15)
EST. AVERAGE GLUCOSE BLD GHB EST-MCNC: 105 MG/DL
EST. AVERAGE GLUCOSE BLD GHB EST-SCNC: 5.8 MMOL/L
GLUCOSE SERPL-MCNC: 98 MG/DL (ref 65–99)
HBA1C MFR BLD: 5.3 % OF TOTAL HGB
HCT VFR BLD AUTO: 42.5 % (ref 35–45)
HDLC SERPL-MCNC: 70 MG/DL
HGB BLD-MCNC: 14.3 G/DL (ref 11.7–15.5)
LDLC SERPL CALC-MCNC: 119 MG/DL (CALC)
MCH RBC QN AUTO: 31.2 PG (ref 27–33)
MCHC RBC AUTO-ENTMCNC: 33.6 G/DL (ref 32–36)
MCV RBC AUTO: 92.8 FL (ref 80–100)
MICROALBUMIN UR-MCNC: 0.7 MG/DL
MITOCHONDRIA AB SER QL IF: NEGATIVE
NONHDLC SERPL-MCNC: 134 MG/DL (CALC)
PLATELET # BLD AUTO: 284 THOUSAND/UL (ref 140–400)
PMV BLD REES-ECKER: 10.6 FL (ref 7.5–12.5)
POTASSIUM SERPL-SCNC: 4.4 MMOL/L (ref 3.5–5.3)
PROT SERPL-MCNC: 7.8 G/DL (ref 6.1–8.1)
RBC # BLD AUTO: 4.58 MILLION/UL (ref 3.8–5.1)
SODIUM SERPL-SCNC: 139 MMOL/L (ref 135–146)
TRIGL SERPL-MCNC: 61 MG/DL
TSH SERPL-ACNC: 1.7 MIU/L (ref 0.4–4.5)
WBC # BLD AUTO: 7.6 THOUSAND/UL (ref 3.8–10.8)

## 2025-05-28 DIAGNOSIS — I10 HYPERTENSION, UNSPECIFIED TYPE: ICD-10-CM

## 2025-05-28 RX ORDER — METOPROLOL TARTRATE 50 MG/1
50 TABLET ORAL 2 TIMES DAILY
Qty: 180 TABLET | Refills: 0 | Status: SHIPPED | OUTPATIENT
Start: 2025-05-28

## 2025-07-25 ENCOUNTER — APPOINTMENT (OUTPATIENT)
Dept: PRIMARY CARE | Facility: CLINIC | Age: 60
End: 2025-07-25
Payer: COMMERCIAL

## 2025-07-25 VITALS
DIASTOLIC BLOOD PRESSURE: 82 MMHG | HEIGHT: 60 IN | BODY MASS INDEX: 31.02 KG/M2 | WEIGHT: 158 LBS | SYSTOLIC BLOOD PRESSURE: 123 MMHG | HEART RATE: 69 BPM

## 2025-07-25 DIAGNOSIS — E78.5 HYPERLIPIDEMIA, UNSPECIFIED HYPERLIPIDEMIA TYPE: Primary | ICD-10-CM

## 2025-07-25 DIAGNOSIS — I10 ESSENTIAL HYPERTENSION: ICD-10-CM

## 2025-07-25 DIAGNOSIS — I51.7 LEFT VENTRICULAR HYPERTROPHY BY ELECTROCARDIOGRAM: ICD-10-CM

## 2025-07-25 PROCEDURE — 99214 OFFICE O/P EST MOD 30 MIN: CPT | Performed by: INTERNAL MEDICINE

## 2025-07-25 PROCEDURE — 3008F BODY MASS INDEX DOCD: CPT | Performed by: INTERNAL MEDICINE

## 2025-07-25 PROCEDURE — 3074F SYST BP LT 130 MM HG: CPT | Performed by: INTERNAL MEDICINE

## 2025-07-25 PROCEDURE — 1036F TOBACCO NON-USER: CPT | Performed by: INTERNAL MEDICINE

## 2025-07-25 PROCEDURE — 3079F DIAST BP 80-89 MM HG: CPT | Performed by: INTERNAL MEDICINE

## 2025-07-25 RX ORDER — EZETIMIBE 10 MG/1
10 TABLET ORAL DAILY
Qty: 30 TABLET | Refills: 5 | Status: SHIPPED | OUTPATIENT
Start: 2025-07-25 | End: 2026-01-21

## 2025-07-25 ASSESSMENT — LIFESTYLE VARIABLES
HOW MANY STANDARD DRINKS CONTAINING ALCOHOL DO YOU HAVE ON A TYPICAL DAY: PATIENT DOES NOT DRINK
HOW OFTEN DO YOU HAVE A DRINK CONTAINING ALCOHOL: NEVER
HOW OFTEN DO YOU HAVE SIX OR MORE DRINKS ON ONE OCCASION: NEVER
SKIP TO QUESTIONS 9-10: 1
AUDIT-C TOTAL SCORE: 0

## 2025-07-25 ASSESSMENT — PATIENT HEALTH QUESTIONNAIRE - PHQ9
SUM OF ALL RESPONSES TO PHQ9 QUESTIONS 1 AND 2: 0
1. LITTLE INTEREST OR PLEASURE IN DOING THINGS: NOT AT ALL
2. FEELING DOWN, DEPRESSED OR HOPELESS: NOT AT ALL

## 2025-07-25 NOTE — PATIENT INSTRUCTIONS
Please check with your GYN if you are due for PAP    Heart Healthy Tips:     -We recommend you follow a heart healthy diet. Watch food labels and try not to  eat more than 2,500 mg of sodium per day. Avoid foods high in salt like  processed meats (lunch meats, longo, and sausage), processed foods (boxed  dinners, canned soups), fried and fast foods. Monitor serving sizes and if the  sodium per serving size is more than 200 mg, avoid those foods. If the sodium  per serving size is between 100-200 mg, you can use those in limited quantities.  Try to choose foods where the amount of sodium per serving size is less than 100  mg. Try to eat a diet rich in fruits and vegetables, whole grains, low fat dairy  products, skinless poultry and fish, nuts, beans, non-tropical vegetable oils.  Limit saturated fat, trans fat, sodium, red meats, and sugar-sweetened  beverages. Limit alcohol    -The combination of a reduced-calorie diet and increased physical activity is  recommended. Adults should aim to get at least 150 minutes of moderate physical  activity per week (30 minutes of moderate physical activities at least 5 days  per week). Examples of moderate physical activities include brisk walking,  swimming, aerobic dancing, heavy gardening, jumping rope, bicycling 10 MPH or  faster, tennis, hiking uphill or with a heavy backpack. Please let us know if  you would like to learn more about your nutrition and calories and additional  options including weight loss programs to help you reach your goal.     -If you smoke, stop smoking. If you stop smoking you can help get rid of a major  source of stress to your heart. Smoking makes your heart rate and blood pressure  go up and increases your risk or developing cardiovascular diseases and worsen  symptoms associated with heart failure.     -Obtain a BP monitor and monitor your BP daily. Check it around the same time  each day; at least 1 hour after taking your medications. Record  your BP in a log  and bring your log with you to your doctor?s appointment.

## 2025-07-25 NOTE — PROGRESS NOTES
Chief Complaint:   Chief Complaint   Patient presents with    Follow-up        Ryann Alcaraz is a 59 y.o. year old female is here for follow-up.   HPI   Ryann Alcaraz is here for follow up on chronic conditions.  Reports no new issues.  Compliant with medications.  Denies side effects.  Doesn't Needs refills on medications.  Chart reviewed, pharmacy updated, prior notes, labs, imaging, EKGs reviewed.    Past Medical History   Medical History[1]   Past Surgical History:   Surgical History[2]  Family History:   Family History[3]  Social History:   Tobacco Use: Low Risk  (7/25/2025)    Patient History     Smoking Tobacco Use: Never     Smokeless Tobacco Use: Never     Passive Exposure: Never      Social History     Substance and Sexual Activity   Alcohol Use Never        Allergies:   RX Allergies[4]     ROS   Review of Systems   All other systems reviewed and are negative.       Objective   Vitals:    07/25/25 0936   BP: 123/82   Pulse: 69      BMI Readings from Last 15 Encounters:   07/25/25 30.86 kg/m²   04/04/25 30.27 kg/m²   01/03/25 30.86 kg/m²   11/11/24 30.87 kg/m²   11/04/24 30.86 kg/m²   10/08/24 29.26 kg/m²   06/12/24 29.08 kg/m²   03/05/24 28.53 kg/m²   02/16/24 29.26 kg/m²   11/10/23 29.08 kg/m²   10/10/23 29.27 kg/m²   05/12/23 28.35 kg/m²   01/16/23 28.90 kg/m²   11/03/22 29.26 kg/m²   10/10/22 29.52 kg/m²      71.7 kg (158 lb) (7/25/2025  9:36 AM)      Physical Exam  Physical Exam  Vitals and nursing note reviewed.     Cardiovascular:      Rate and Rhythm: Normal rate and regular rhythm.      Pulses: Normal pulses.      Heart sounds: Normal heart sounds.   Pulmonary:      Effort: Pulmonary effort is normal.      Breath sounds: Normal breath sounds.          Labs:  CBC:  Recent Labs     04/08/25  1007 10/08/24  1104 11/10/23  0952   WBC 7.6 7.5 7.5   HGB 14.3 14.3 13.8   HCT 42.5 42.9 42.4    255 300   MCV 92.8 93 96     CMP:  Recent Labs     04/08/25  1007 10/08/24  1104 06/12/24  1458   NA  "139 141 140   K 4.4 3.9 4.2    101 102   CO2 24 29 29   ANIONGAP 12 15 13   BUN 19 20 14   CREATININE 0.88 0.98 0.87   EGFR 75 66 76   GLUCOSE 98 80 89     Recent Labs     04/08/25  1007 10/08/24  1104 06/12/24  1458   ALBUMIN 4.3 4.1 4.2   ALKPHOS 88 71 96   ALT 31* 17 24   AST 35 18 23   BILITOT 0.6 0.7 0.9     Calcium/Phos:   Lab Results   Component Value Date    CALCIUM 9.8 04/08/2025      COAG: No results for input(s): \"INR\", \"DDIMERVTE\" in the last 55305 hours.  CRP:   Lab Results   Component Value Date    CRP 0.31 11/12/2018      [unfilled]   ENDO:  Recent Labs     04/08/25  1007 10/08/24  1104 11/10/23  0952 10/10/22  1016   TSH 1.70 1.58 1.87 1.30   HGBA1C 5.3 4.7 4.9 4.9      CARDIAC: No results for input(s): \"LDH\", \"CKMB\", \"TROPHS\", \"BNP\" in the last 08247 hours.    No lab exists for component: \"CK\", \"CKMBP\"  Recent Labs     04/08/25  1007 10/08/24  1104 11/10/23  0952 05/12/23  1151 10/10/22  1016 07/18/22  0958   CHOL 204* 241* 205* 210* 194 179   LDLF  --   --   --  133* 112* 91   LDLCALC 119* 157* 121*  --   --   --    HDL 70 71.5 71.2 62.2 70.4 75.6   TRIG 61 63 64 74 56 60     No data recorded    Current Medications:  Current Medications[5]    Assessment and Plan  Ryann was seen today for follow-up.  Diagnoses and all orders for this visit:  Hyperlipidemia, unspecified hyperlipidemia type (Primary)  -     ezetimibe (Zetia) 10 mg tablet; Take 1 tablet (10 mg) by mouth once daily.  Essential hypertension  Left ventricular hypertrophy by electrocardiogram     #HTN  Well controlled.  Continue with current medications.        #HLD  - Patient myalgia improved after stopping rosuvastatin, will switch to Ezetimibe  CT score zero      # HM   - was advised to follow up with Gyne for Pap smear     By optimizing your health through good nutrition, daily exercise, stress management, low/moderate alcohol and avoidance of tobacco, sugar and processed foods, you can help to decrease your risk of " cardiovascular disease and stroke and achieve a healthy weight. A diet rich in whole, plant-based foods such as vegetables, beans, seeds, nuts, whole grains, healthy fats and fruit - leads to lower body mass index, blood pressure, HbA1C, and cholesterol levels.         Immunizations:  Immunization History   Administered Date(s) Administered    Flu vaccine (IIV4), preservative free *Check age/dose* 10/15/2019, 09/11/2020, 10/25/2022    Flu vaccine, trivalent, preservative free, age 6 months and greater (Fluarix/Fluzone/Flulaval) 10/08/2024    MMR vaccine, subcutaneous (MMR II) 11/10/2011    Pfizer COVID-19 vaccine, bivalent, age 12 years and older (30 mcg/0.3 mL) 10/25/2022    Pfizer Purple Cap SARS-CoV-2 03/23/2021, 04/16/2021    Td vaccine, age 7 years and older (TDVAX) 03/01/2001    Tdap vaccine, age 7 year and older (BOOSTRIX, ADACEL) 11/07/2011, 10/08/2024     Jarvis Coello  PGY-1 Internal Medicine            [1]   Past Medical History:  Diagnosis Date    Abdominal distension (gaseous) 06/02/2018    Bloating    Abnormal results of liver function studies 06/09/2018    Abnormal liver function    Abnormal weight gain 12/12/2015    Abnormal weight gain    Body mass index (BMI) 29.0-29.9, adult 01/07/2022    Body mass index (BMI) of 29.0 to 29.9 in adult    Fibroid     Flat foot (pes planus) (acquired), unspecified foot 11/26/2018    Pes planus    Hypersomnia, unspecified 03/24/2015    Excessive sleepiness    Localized edema 09/30/2014    Pedal edema    Localized edema 11/26/2018    Leg edema, right    Other conditions influencing health status 12/12/2015    Stiffness of extremity    Other spondylosis with myelopathy, cervical region     Cervical spondylosis with myelopathy    Pain in right foot 12/12/2015    Pain in both feet    Pain in right foot 11/21/2018    Right foot pain    Pain in right knee 11/12/2018    Acute pain of right knee    Plantar fascial fibromatosis 12/12/2015    Plantar fasciitis    Posterior  tibial tendinitis, unspecified leg 11/24/2014    Posterior tibial tendonitis    Radiculopathy, lumbar region 08/31/2018    Lumbar radiculopathy    S/P right breast biopsy 01/27/2009    Right benign excisional biopsy 1998, 2004, 2009    Scoliosis, unspecified     Scoliosis    Unspecified injury of right foot, initial encounter 11/16/2018    Right foot injury, initial encounter    Vitamin D deficiency, unspecified 06/20/2013    Vitamin D deficiency   [2]   Past Surgical History:  Procedure Laterality Date    BACK SURGERY  11/20/2016    Back Surgery    BREAST BIOPSY  06/19/2013    Biopsy Breast Percutaneous Needle Core    CERVICAL DISCECTOMY  06/19/2013    Spinal Diskectomy Cervical    COLONOSCOPY  05/30/2019    Complete Colonoscopy    OTHER SURGICAL HISTORY  02/24/2014    Thyroglossal Duct Cyst Excision   [3]   Family History  Problem Relation Name Age of Onset    Breast cancer Father's Sister Yeimy 80    Arthritis Mother Judith Alcaraz     Blood clot Mother Judith Alcaraz     Cancer Mother Judith Alcaraz     Hypertension Mother Judith Alcaraz     Kidney disease Mother Judith Alcaraz     Stroke Mother Judith Alcaraz     Heart disease Father Buzz     Arthritis Sister Schyrle     Diabetes Sister Schyrle     Hypertension Sister Schyrle     Atrial fibrillation Brother Taz     Heart disease Brother Taz     Hypertension Brother Taz     Cancer Sister Rachel     Heart disease Brother Ayden     Hypertension Brother Ayden     Heart disease Brother Abdiel    [4]   Allergies  Allergen Reactions    Dicloxacillin Unknown    Duloxetine Diarrhea, Nausea Only, Unknown and Nausea/vomiting    Tramadol Nausea Only, Unknown and Nausea/vomiting   [5]   Current Outpatient Medications   Medication Sig Dispense Refill    cholecalciferol (Vitamin D-3) 25 MCG (1000 UT) tablet Take 1 tablet (25 mcg) by mouth once daily.      ezetimibe (Zetia) 10 mg tablet Take 1 tablet (10 mg) by mouth once daily. 30 tablet 5    metoprolol tartrate  (Lopressor) 50 mg tablet TAKE 1 TABLET BY MOUTH TWICE A  tablet 0    multivit with minerals/lutein (MULTIVITAMIN 50 PLUS ORAL) Take 1 tablet by mouth once daily.      rosuvastatin (Crestor) 10 mg tablet Take 1 tablet (10 mg) by mouth once daily. 90 tablet 0    triamterene-hydrochlorothiazid (Maxzide-25) 37.5-25 mg tablet Take 1 tablet by mouth once daily. 90 tablet 0     No current facility-administered medications for this visit.

## 2025-08-26 DIAGNOSIS — I10 HYPERTENSION, UNSPECIFIED TYPE: ICD-10-CM

## 2025-08-26 RX ORDER — METOPROLOL TARTRATE 50 MG/1
50 TABLET ORAL 2 TIMES DAILY
Qty: 180 TABLET | Refills: 0 | Status: SHIPPED | OUTPATIENT
Start: 2025-08-26

## 2025-11-04 ENCOUNTER — APPOINTMENT (OUTPATIENT)
Dept: PRIMARY CARE | Facility: CLINIC | Age: 60
End: 2025-11-04
Payer: COMMERCIAL